# Patient Record
Sex: MALE | Race: WHITE | NOT HISPANIC OR LATINO | Employment: UNEMPLOYED | URBAN - METROPOLITAN AREA
[De-identification: names, ages, dates, MRNs, and addresses within clinical notes are randomized per-mention and may not be internally consistent; named-entity substitution may affect disease eponyms.]

---

## 2021-12-07 ENCOUNTER — APPOINTMENT (OUTPATIENT)
Dept: GENERAL RADIOLOGY | Facility: CLINIC | Age: 21
End: 2021-12-07
Attending: EMERGENCY MEDICINE
Payer: COMMERCIAL

## 2021-12-07 ENCOUNTER — HOSPITAL ENCOUNTER (EMERGENCY)
Facility: CLINIC | Age: 21
Discharge: ANOTHER HOSPITAL | End: 2021-12-08
Attending: EMERGENCY MEDICINE | Admitting: EMERGENCY MEDICINE
Payer: COMMERCIAL

## 2021-12-07 ENCOUNTER — TELEPHONE (OUTPATIENT)
Dept: BEHAVIORAL HEALTH | Facility: CLINIC | Age: 21
End: 2021-12-07

## 2021-12-07 DIAGNOSIS — R45.851 SUICIDAL IDEATION: ICD-10-CM

## 2021-12-07 DIAGNOSIS — R41.89 DISORGANIZED THINKING: ICD-10-CM

## 2021-12-07 LAB
AMPHETAMINES UR QL SCN: ABNORMAL
BARBITURATES UR QL: ABNORMAL
BENZODIAZ UR QL: ABNORMAL
CANNABINOIDS UR QL SCN: ABNORMAL
COCAINE UR QL: ABNORMAL
OPIATES UR QL SCN: ABNORMAL
SARS-COV-2 RNA RESP QL NAA+PROBE: NEGATIVE

## 2021-12-07 PROCEDURE — 87635 SARS-COV-2 COVID-19 AMP PRB: CPT | Performed by: EMERGENCY MEDICINE

## 2021-12-07 PROCEDURE — 250N000013 HC RX MED GY IP 250 OP 250 PS 637: Performed by: EMERGENCY MEDICINE

## 2021-12-07 PROCEDURE — 99285 EMERGENCY DEPT VISIT HI MDM: CPT | Mod: 25 | Performed by: EMERGENCY MEDICINE

## 2021-12-07 PROCEDURE — 90791 PSYCH DIAGNOSTIC EVALUATION: CPT

## 2021-12-07 PROCEDURE — 73110 X-RAY EXAM OF WRIST: CPT | Mod: LT

## 2021-12-07 PROCEDURE — C9803 HOPD COVID-19 SPEC COLLECT: HCPCS | Performed by: EMERGENCY MEDICINE

## 2021-12-07 PROCEDURE — 73130 X-RAY EXAM OF HAND: CPT | Mod: LT

## 2021-12-07 PROCEDURE — 80307 DRUG TEST PRSMV CHEM ANLYZR: CPT | Performed by: EMERGENCY MEDICINE

## 2021-12-07 PROCEDURE — 99284 EMERGENCY DEPT VISIT MOD MDM: CPT | Performed by: EMERGENCY MEDICINE

## 2021-12-07 RX ORDER — TEMAZEPAM 30 MG
30 CAPSULE ORAL AT BEDTIME
Status: DISCONTINUED | OUTPATIENT
Start: 2021-12-07 | End: 2021-12-08 | Stop reason: HOSPADM

## 2021-12-07 RX ORDER — RISPERIDONE 1 MG/1
1 TABLET ORAL AT BEDTIME
Status: DISCONTINUED | OUTPATIENT
Start: 2021-12-07 | End: 2021-12-08 | Stop reason: HOSPADM

## 2021-12-07 RX ORDER — GUANFACINE 2 MG/1
4 TABLET, EXTENDED RELEASE ORAL DAILY
Status: DISCONTINUED | OUTPATIENT
Start: 2021-12-08 | End: 2021-12-08 | Stop reason: HOSPADM

## 2021-12-07 RX ORDER — DULOXETIN HYDROCHLORIDE 30 MG/1
30 CAPSULE, DELAYED RELEASE ORAL DAILY
Status: DISCONTINUED | OUTPATIENT
Start: 2021-12-08 | End: 2021-12-08 | Stop reason: HOSPADM

## 2021-12-07 RX ORDER — PRAZOSIN HYDROCHLORIDE 2 MG/1
4 CAPSULE ORAL AT BEDTIME
Status: DISCONTINUED | OUTPATIENT
Start: 2021-12-07 | End: 2021-12-08 | Stop reason: HOSPADM

## 2021-12-07 RX ORDER — VILAZODONE HYDROCHLORIDE 20 MG/1
20 TABLET ORAL DAILY
Status: ON HOLD | COMMUNITY
End: 2021-12-15

## 2021-12-07 RX ORDER — PRAZOSIN HYDROCHLORIDE 2 MG/1
4 CAPSULE ORAL AT BEDTIME
Status: ON HOLD | COMMUNITY
End: 2021-12-15

## 2021-12-07 RX ORDER — IBUPROFEN 200 MG
200-400 TABLET ORAL EVERY 4 HOURS PRN
COMMUNITY

## 2021-12-07 RX ORDER — GUANFACINE 2 MG/1
4 TABLET, EXTENDED RELEASE ORAL EVERY MORNING
Status: ON HOLD | COMMUNITY
End: 2021-12-15

## 2021-12-07 RX ORDER — TEMAZEPAM 30 MG
30 CAPSULE ORAL AT BEDTIME
Status: ON HOLD | COMMUNITY
End: 2021-12-15

## 2021-12-07 RX ORDER — VILAZODONE HYDROCHLORIDE 10 MG/1
20 TABLET ORAL DAILY
Status: DISCONTINUED | OUTPATIENT
Start: 2021-12-08 | End: 2021-12-08 | Stop reason: HOSPADM

## 2021-12-07 RX ORDER — DULOXETIN HYDROCHLORIDE 30 MG/1
30 CAPSULE, DELAYED RELEASE ORAL EVERY MORNING
Status: ON HOLD | COMMUNITY
End: 2021-12-15

## 2021-12-07 RX ORDER — LISDEXAMFETAMINE DIMESYLATE 40 MG/1
40 CAPSULE ORAL EVERY MORNING
Status: ON HOLD | COMMUNITY
End: 2021-12-15

## 2021-12-07 RX ORDER — LAMOTRIGINE 100 MG/1
250 TABLET ORAL EVERY MORNING
Status: ON HOLD | COMMUNITY
End: 2021-12-15

## 2021-12-07 RX ORDER — LISDEXAMFETAMINE DIMESYLATE 20 MG/1
40 CAPSULE ORAL DAILY
Status: DISCONTINUED | OUTPATIENT
Start: 2021-12-08 | End: 2021-12-08 | Stop reason: HOSPADM

## 2021-12-07 RX ORDER — RISPERIDONE 1 MG/1
1 TABLET ORAL AT BEDTIME
Status: ON HOLD | COMMUNITY
End: 2021-12-15

## 2021-12-07 RX ADMIN — TEMAZEPAM 30 MG: 30 CAPSULE ORAL at 23:00

## 2021-12-07 RX ADMIN — PRAZOSIN HYDROCHLORIDE 4 MG: 2 CAPSULE ORAL at 23:00

## 2021-12-07 RX ADMIN — RISPERIDONE 1 MG: 1 TABLET ORAL at 23:00

## 2021-12-07 NOTE — ED PROVIDER NOTES
"ED Provider Note  [facility]      History     Chief Complaint   Patient presents with     Suicidal     was at his therapist today, was told to go the ED for Psyche eval, per pt he has been having suicidal thoughts, denies any plans. Dx: ADHD, Autism, WARREN, Major depressive d/o     Self Harm - Deliberate (Cpm)     \"Slammed my head on a counter top, pulled the drawer and my L hand must have bumped it on the wall, now it's hurting\" Denied wanting to harm self when SIB was done. L hand red, swollen on the lateral side      The history is provided by the patient and medical records.     Yoni Tran is a 21 year old male with history of autism, ADHD, MDD, and WARREN presenting to the ED as recommended by his therapist for suicidal ideation and self-injurious behavior.  Patient states that today he became extremely frustrated,  had no other way to express his emotions, and did not want to hurt anyone else around him, so he slammed his head on a counter in front of him.  He did not lose consciousness.  He endorses some mild pain in the right forehead where he hit the counter.  He states that he feels a little foggy, but has not taken his ADHD medications today. He denies nausea, vomiting, vision changes, numbness, tingling, weakness, neck pain.  He also reports pulling a drawer out very fast and hitting his left hand.  He endorses pain in the left ulnar hand now.  He denies that this was with the intent to self-harm. Patient states that in the past 6 months he has been in a \"rut\" of doing nothing but going to work, coming home and consuming media and only consuming media on the days that he is not working.  He states that he has lots of pent-up emotions, ruminates on the past, and worries he is making bad decisions and regrets them after. He states that this has happened to him in the past and it all builds up to a peak that results in hospitalization which is where he feels this could be " "headed, but he wants to avoid. He states that the is a \"broken bone that needs fixing\".  He states that he has tried lots of medications for his anxiety and depression in the past 4 years.  He was started on temazepam last week and is tapering down on one of his ADHD meds.  No other recent medication changes.    Past Medical History  No past medical history on file.  No past surgical history on file.  No current outpatient medications on file.    No Known Allergies  Family History  No family history on file.  Social History   Social History     Tobacco Use     Smoking status: Not on file     Smokeless tobacco: Not on file   Substance Use Topics     Alcohol use: Never     Drug use: Not on file      Past medical history, past surgical history, medications, allergies, family history, and social history were reviewed with the patient. No additional pertinent items.       Review of Systems  A complete review of systems was performed with pertinent positives and negatives noted in the HPI, and all other systems negative.    Physical Exam   BP: (!) 137/93  Pulse: 71  Temp: 98.8  F (37.1  C)  Resp: 16  Weight: 87.5 kg (193 lb)  SpO2: 100 %  Physical Exam  General: patient is alert and oriented, anxious appearing    Head: small area of ecchymosis on the frontal scalp, no boggy hematoma or step off, normocephalic   EENT: moist mucus membranes without tonsillar erythema or exudates, pupils 4mm equal round and reactive, EOMI, no postauricular ecchymoses, no hemotympanum, no periorbital ecchymosis  Neck: supple with full range of motion, no midline cervical spine tenderness to palpation  Cardiovascular: regular rate and rhythm, extremities warm and well perfused, no lower extremity edema  Pulmonary: lungs clear to auscultation bilaterally   Abdomen: soft, non-tender   Musculoskeletal: normal range of motion of the extremities, tenderness to palpation along the left dorsal ulnar hand near the wrist with slight swelling, able to " fully flex and extend at the wrist, able to fully flex and extend at both actively and against resistance at the MCP, PIP and DIP joints  Neurological:alert and oriented, moving all extremities symmetrically, CN II-XII intact, strength 5/5 and symmetric in , elbow flexion/extension, hip flexion/extension, knee flexion/extension, sensation to light touch in distal upper and lower extremities intact, normal gait  Skin: warm, dry     ED Course      Procedures         Mental Health Risk Assessment      PSS-3    Date and Time Over the past 2 weeks have you felt down, depressed, or hopeless? Over the past 2 weeks have you had thoughts of killing yourself? Have you ever attempted to kill yourself? When did this last happen? User   12/07/21 1555 yes yes yes more than 6 months ago IT      C-SSRS ([place])    Date and Time Q1 Wished to be Dead (Past Month) Q2 Suicidal Thoughts (Past Month) Q3 Suicidal Thought Method Q4 Suicidal Intent without Specific Plan Q5 Suicide Intent with Specific Plan Q6 Suicide Behavior (Lifetime) Within the Past 3 Months? RETIRED: Level of Risk per Screen Screening Not Complete User   12/07/21 1555 yes no no yes no yes -- -- -- IT              Suicide assessment completed by mental health (D.E.C., LCSW, etc.)       No results found for any visits on 12/07/21.  Medications - No data to display     Assessments & Plan (with Medical Decision Making)   Mr. Tran is a 21 year old male with a history of ADHD, autism, generalized anxiety disorder and major depressive disorder who presents with suicidal thoughts and self-injurious behavior.  He does have a small area of ecchymoses on the frontal scalp but has no other red flag findings for significant intracranial injury.  Does not have indication for further imaging by [facility] head CT rules.  At this time we will plan to observe clinically.  He does report also hitting his left hand against a wall and has tenderness to palpation along the proximal  left hand and wrist.  X-ray was obtained which are negative for fracture.  Patient was seen and evaluated by DEC Assessors with recommendation for inpatient hospitalization for safety and stabilization as he is quite disorganized and has been increasingly anxious and depressed with suicidal thoughts.    I have reviewed the nursing notes. I have reviewed the findings, diagnosis, plan and need for follow up with the patient.    New Prescriptions    No medications on file       Final diagnoses:   Suicidal ideation   Disorganized thinking   I, Jyoti Cevallos, am serving as a trained medical scribe to document services personally performed by Belén Meade MD, based on the provider's statements to me.     I, Belén Meade MD, was physically present and have reviewed and verified the accuracy of this note documented by Jyoti Cevallos.      --  Belén Meade MD  [facility] EMERGENCY DEPARTMENT  12/7/2021     Belén Meade MD  12/07/21 [id number]

## 2021-12-07 NOTE — TELEPHONE ENCOUNTER
"S: 5:04 pm Pt is a 21 yr old male in [place] ED for SI and possible psychosis report by [facility]    B: Hx of ADHD, ASD, and depression.  Pt reports primary stressors: family issues.   reports pt is struggling to answer questions, is not oriented to situation, is not doing his ADLs, is disorganized and tangential.  Pt reports if he was suicidal he would \"bury myself.\"  1 prev ip admission.  No reported medical concerns.  Pt reports using Delta 8.  COVID test processing.  Drug screen ordered.     A: vol     R: Pt waiting in the ED for appropriate placement     Patient cleared and ready for behavioral bed placement: Yes    "

## 2021-12-07 NOTE — ED NOTES
"12/7/2021  Yoni JENNIFER [place] 2000     [facility] Crisis Assessment    Patient was assessed: in person  Patient location: [facility]    Referral Data and Chief Complaint  Patient is a 21 year old who uses he/him pronouns. Patient presented to the ED with mother Zuleika per therapist's recommendation. Patient is presenting to the ED for the following concerns: suicidal ideation.      Informed Consent and Assessment Methods    Patient is his own guardian. Writer met with patient and explained the crisis assessment process, including applicable information disclosures and limits to confidentiality, assessed understanding of the process, and obtained consent to proceed with the assessment. Patient was observed to be able to participate in the assessment as evidenced by verbal consent. Assessment methods included conducting a formal interview with patient, review of medical records, collaboration with medical staff, and obtaining relevant collateral information from family and community providers when available.    Narrative Summary of Presenting Problem and Current Functioning  What led to the patient presenting for crisis services, factors that make the crisis life threatening or complex, stressors, how is this disrupting the patient's life, and how current functioning is in comparison to baseline. How is patient presenting during the assessment.     Patient reports the big overarching problem is his depression over the past 4 years, with only 1 break of a summer.  Patient cites primary stressor as being diagnosed with ADHD and autism recently which put a lot of things in perspective, putting on his dad has alcohol abuse, parents  after 28 years, school entering their money and leaving them $40,000 left in the bank. Patient denies any new or acute stressors today. Patient reports this \"could have been any day in the past year, no particular reason today\".    Patient reports feeling as though he has \"no " "idea who he is, has a lack of life skills, feels he is an embarrassment, and no typical merits\".  Patient reports he has felt unable to function, has no consistent routine, no motivation to change, and when he tries to change she cannot keep it up.  Patient reports having difficulty verbalizing his emotions and reports he is saying the opposite of what he wants to be saying.  Patient believes that there is no point to our conversation.  Patient then asks to restart our assessment.    Patient endorses suicidal ideation, \"if I can't say no, yes\".  Patient reports he would kill himself \"in a way where it would not affect innocent bystanders\".  Patient reports he would want to snap his fingers and just die.  Patient gives one example as an explosion that would not hurt other people, another plan to bury himself.      History of the Crisis  Duration of the current crisis, coping skills attempted to reduce the crisis, community resources used, and past presentations.    Patient has history of inpatient mental health admission, most recently in 11/2020 in [place], [place] following an aborted suicide attempt at the [facility].  Patient's mother describes patient attending different stints at treatment facilities historically, patient did not follow up with or complete.  Patient has established individual therapy and medication management at this time.    Collateral Information   spoke with patient's mother Zuleika who was present in our emergency department: Patient's mother reports patient approached her this morning requesting to come to our emergency department.  Patient's mother attempted to de-escalate the situation and encouraged him to attend his already scheduled therapy appointment. Patient did not finish his appointment before therapist recommended further evaluation in our emergency department.  Patient then became agitated, slamming his head on his desk and pulling a drawer out aggressively.  " "Patient is described as a gentle person at baseline.  Patient's mother transported him to our emergency department per therapist recommendation and patient's request.    Risk Assessment    Risk of Harm to Self     ESS-6  1.a. Over the past 2 weeks, have you had thoughts of killing yourself? Yes  1.b. Have you ever attempted to kill yourself and, if yes, when did this last happen? Yes aborted suicidal ideation last year, [place] the [place] and drove there.   2. Recent or current suicide plan? Yes bury self   3. Recent or current intent to act on ideation? \"If I can't say no, then yes\"  4. Lifetime psychiatric hospitalization? Yes  5. Pattern of excessive substance use? Yes  6. Current irritability, agitation, or aggression? Yes  Scoring note: BOTH 1a and 1b must be yes for it to score 1 point, if both are not yes it is zero. All others are 1 point per number. If all questions 1a/1b - 6 are no, risk is negligible. If one of 1a/1b is yes, then risk is mild. If either question 2 or 3, but not both, is yes, then risk is automatically moderate regardless of total score. If both 2 and 3 are yes, risk is automatically high regardless of total score.     Score: 6, high risk    The patient has the following risk factors for suicide: depressive symptoms, poor decision making, poor impulse control, significant behavioral changes and restless/agitated    Is the patient experiencing current suicidal ideation: Yes. Plan: bury himself \"in a way where it wouldn't affect innocent bystanders\" Intent \"if I can't say no, then yes\"    Is the patient engaging in preparatory suicide behaviors (formulating how to act on plan, giving away possessions, saying goodbye, displaying dramatic behavior changes, etc)? Yes formulating plan, negative self talk, hopelessness.    Does the patient have access to firearms or other lethal means? no    The patient has the following protective factors: established " relationship community mental health provider(s), safe/stable housing and fulfilling employment    Support system information: patient has support from mother, therapist, and medication provider.    Does the patient engage in non-suicidal self-injurious behavior (NSSI/SIB)?  Patient endorses scratching himself 1 month ago, reports he felt better at the time afterwards.    Is the patient vulnerable to sexual exploitation?  No    Is the patient experiencing abuse or neglect? no    Is the patient a vulnerable adult? No      Risk of Harm to Others  The patient has the following risk factors of harm to others: no risk factors identified    Does the patient have thoughts of harming others? No    Is the patient engaging in sexually inappropriate behavior?  no       Current Substance Abuse    Is there recent substance abuse?  Patient endorses vaping delta-8-Tetrahydrocannabinol on a daily basis for the past 1 year, most recently last night.    Was a urine drug screen or blood alcohol level obtained: Yes ordered not yet collected.      Current Symptoms/Concerns    Symptoms  Attention, hyperactivity, and impulsivity symptoms present: Yes: Disorganized/Forgetful, Inattentive and Restless    Anxiety symptoms present: Yes: Generalized Symptoms: Agitation, Avoidance, Cognitive anxiety - feelings of doom, racing thoughts, difficulty concentrating  and Excessive worry      Appetite symptoms present: No     Behavioral difficulties present: Yes: Agitation, Disruptive and Impulsivity/Disinhibition     Cognitive impairment symptoms present: Yes: Decision-Making and Judgment/Insight    Depressive symptoms present: Yes Depressed mood, Excessive guilt , Feelings of helplessness , Feelings of hopelessness , Feelings of worthlessness , Loss of interest / Anhedonia , Low self esteem  and Thoughts of suicide/death      Eating disorder symptoms present: No    Learning disabilities, cognitive challenges, and/or developmental disorder symptoms  "present: Yes: Mood and Self-Regulation     Manic/hypomanic symptoms present: Yes Flight of ideas and Distractibility     Personality and interpersonal functioning difficulties present : Yes: Cognitive Distortions and Emotional Deregulation    Psychosis symptoms present: No      Sleep difficulties present: Patient states \"sometimes I sleep, sometimes I don't\".    Substance abuse disorder symptoms present: Yes Continued substance use despite having persistent or recurrent social or interpersonal problems caused by or exacerbated by the use of substance(s), Important social, occupational, or recreational activities are given up or reduced because of substance use and Substance abuse is continued despite knowledge of having a persistent or recurrent physical or psychological problem that has been caused of exacerbated by substance use     Trauma and stressor related symptoms present: No           Mental Status Exam   Affect: Labile   Appearance: Disheveled    Attention Span/Concentration: Inattentive?    Eye Contact: Avoidant   Fund of Knowledge: Appropriate    Language /Speech Content: Fluent   Language /Speech Volume: Normal    Language /Speech Rate/Productions: Minimally Responsive    Recent Memory: Variable   Remote Memory: Variable   Mood: Anxious    Orientation to Person: Yes    Orientation to Place: Yes   Orientation to Time of Day: Yes    Orientation to Date: Yes    Situation (Do they understand why they are here?): No    Psychomotor Behavior: Agitated    Thought Content: Suicidal   Thought Form: Flight of Ideas, Loose Associations and Tangential       Mental Health and Substance Abuse History    History  Current and historical diagnoses or mental health concerns: Patient has a history of autism, ADHD, and depression.    Prior MH services (inpatient, programmatic care, outpatient, etc) : Yes Patient has a history of inpatient mental health admission, most recently last year in [facility] following an " "aborted suicide attempt at the [facility].  Patient's mother describes patient attending different stents at treatment facilities historically, patient did not follow up with or complete.    History of substance abuse: Yes history of polysubstance abuse, most recently only using delta 8.    Prior DYLLAN services (inpatient, programmatic care, detox, outpatient, etc) : No    History of commitment: No    Family history of MH/DYLLAN: Yes patient reports his father has alcohol abuse.    Trauma history: No    Medication  Psychotropic medications: Unknown. Patient denies taking his medication as prescribed today, not showering, or drinking his coffee.    Current Care Team  Primary Care Provider: [facility] [place]    Psychiatrist: Unknown.    Therapist: Nathalie Callahan Psy.D    : No    CTSS or ARMHS: No    ACT Team: No    Other: No    Release of Information  Was a release of information signed: No. Reason: patient too disorganized.      Biopsychosocial Information    Socioeconomic Information  Current living situation: Patient lives with his mother.  Patient's parents are  for 28 years and recently .  Patient does not speak to his father anymore.     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  Patient's highest level of education is some college at [employer], [employer], and [employer].    Relevant legal issues: None reported.    Cultural, Evangelical, or spiritual influences on mental health care: None reported.    Is the patient active in the  or a : None reported.      Relevant Medical Concerns   Patient identifies concerns with completing ADLs? Yes     Patient can ambulate independently? Yes     Other medical concerns? Yes patient has pain in his arm from pulling a drawer out aggressively this afternoon.  Patient also \"slammed his head against a desk\".     History of concussion or TBI? 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 Patient response to intervention: disengaged.      Disposition  Recommended disposition: Inpatient Mental Health      Reviewed case and recommendations with attending provider. Attending Name: Dr. Belén Angel      Attending concurs with disposition: Yes      Patient concurs with disposition: Yes      Guardian concurs with disposition: NA     Final disposition: Inpatient mental health .     Inpatient Details (if applicable):  Is patient admitted voluntarily:Yes    Patient aware of potential for transfer if there is not appropriate placement? No     Patient is willing to travel outside of the Health system for placement? No      Behavioral Intake Notified? Yes: Date: 12/7/2021 Time: 5:30pm.       Clinical Substantiation of Recommendations   Rationale with supporting factors for disposition and diagnosis.     Patient presents as disorganized, inattentive, agitated, having difficulty finding words.  Patient has difficulty answering even close ended, yes/no questions as evidenced by pulling his hair and losing his train of thought.  Patient cites various stressors that are difficult to track, denies any acute changes in them. Patient endorses self-injurious behavior including hitting himself 1 month ago which he reports made him feel better at the time. Patient  endorses suicidal ideation, intent, and  plan. Patient denies homicidal ideation, intent, and plan. Patient denies symptoms of psychosis. Patient does not appear to be responding to internal stimuli. Patient  endorses substance use including delta-8-Tetrahydrocannabinol, please see details above.         Assessment Details  Patient interview started at: 4:15pm and completed at: 5:00pm.    Total duration spent on the patient case in minutes: .75 hrs     CPT code(s) utilized: 26793 - Psychotherapy for Crisis - 60 (30-74*) min     TEMO Woods

## 2021-12-08 ENCOUNTER — HOSPITAL ENCOUNTER (INPATIENT)
Facility: CLINIC | Age: 21
LOS: 7 days | Discharge: HOME | End: 2021-12-15
Attending: PSYCHIATRY & NEUROLOGY | Admitting: PSYCHIATRY & NEUROLOGY
Payer: COMMERCIAL

## 2021-12-08 VITALS
TEMPERATURE: 98.4 F | WEIGHT: 193 LBS | BODY MASS INDEX: 25.46 KG/M2 | RESPIRATION RATE: 18 BRPM | HEART RATE: 76 BPM | SYSTOLIC BLOOD PRESSURE: 124 MMHG | DIASTOLIC BLOOD PRESSURE: 72 MMHG | OXYGEN SATURATION: 99 %

## 2021-12-08 DIAGNOSIS — F33.1 MAJOR DEPRESSIVE DISORDER, RECURRENT EPISODE, MODERATE (H): ICD-10-CM

## 2021-12-08 DIAGNOSIS — F43.10 PTSD (POST-TRAUMATIC STRESS DISORDER): ICD-10-CM

## 2021-12-08 DIAGNOSIS — M79.2 NERVE PAIN: ICD-10-CM

## 2021-12-08 DIAGNOSIS — G47.09 OTHER INSOMNIA: ICD-10-CM

## 2021-12-08 DIAGNOSIS — F90.8 ATTENTION DEFICIT HYPERACTIVITY DISORDER (ADHD), OTHER TYPE: Primary | Chronic | ICD-10-CM

## 2021-12-08 PROBLEM — R45.851 DEPRESSION WITH SUICIDAL IDEATION: Status: ACTIVE | Noted: 2021-12-08

## 2021-12-08 PROBLEM — F32.A DEPRESSION WITH SUICIDAL IDEATION: Status: ACTIVE | Noted: 2021-12-08

## 2021-12-08 PROCEDURE — 128N000001 HC R&B CD/MH ADULT

## 2021-12-08 PROCEDURE — 250N000013 HC RX MED GY IP 250 OP 250 PS 637: Performed by: FAMILY MEDICINE

## 2021-12-08 PROCEDURE — 250N000013 HC RX MED GY IP 250 OP 250 PS 637: Performed by: NURSE PRACTITIONER

## 2021-12-08 PROCEDURE — 250N000013 HC RX MED GY IP 250 OP 250 PS 637: Performed by: EMERGENCY MEDICINE

## 2021-12-08 RX ORDER — OLANZAPINE 10 MG/2ML
10 INJECTION, POWDER, FOR SOLUTION INTRAMUSCULAR 3 TIMES DAILY PRN
Status: DISCONTINUED | OUTPATIENT
Start: 2021-12-08 | End: 2021-12-15 | Stop reason: HOSPADM

## 2021-12-08 RX ORDER — TEMAZEPAM 15 MG/1
30 CAPSULE ORAL AT BEDTIME
Status: DISCONTINUED | OUTPATIENT
Start: 2021-12-08 | End: 2021-12-15 | Stop reason: HOSPADM

## 2021-12-08 RX ORDER — ACETAMINOPHEN 325 MG/1
650 TABLET ORAL EVERY 4 HOURS PRN
Status: DISCONTINUED | OUTPATIENT
Start: 2021-12-08 | End: 2021-12-09

## 2021-12-08 RX ORDER — MAGNESIUM HYDROXIDE/ALUMINUM HYDROXICE/SIMETHICONE 120; 1200; 1200 MG/30ML; MG/30ML; MG/30ML
30 SUSPENSION ORAL EVERY 4 HOURS PRN
Status: DISCONTINUED | OUTPATIENT
Start: 2021-12-08 | End: 2021-12-15 | Stop reason: HOSPADM

## 2021-12-08 RX ORDER — RISPERIDONE 0.5 MG/1
1 TABLET ORAL AT BEDTIME
Status: DISCONTINUED | OUTPATIENT
Start: 2021-12-08 | End: 2021-12-09

## 2021-12-08 RX ORDER — PRAZOSIN HYDROCHLORIDE 1 MG/1
4 CAPSULE ORAL AT BEDTIME
Status: DISCONTINUED | OUTPATIENT
Start: 2021-12-08 | End: 2021-12-15 | Stop reason: HOSPADM

## 2021-12-08 RX ORDER — TRAZODONE HYDROCHLORIDE 50 MG/1
50 TABLET, FILM COATED ORAL
Status: DISCONTINUED | OUTPATIENT
Start: 2021-12-08 | End: 2021-12-15 | Stop reason: HOSPADM

## 2021-12-08 RX ORDER — IBUPROFEN 600 MG/1
600 TABLET, FILM COATED ORAL ONCE
Status: COMPLETED | OUTPATIENT
Start: 2021-12-08 | End: 2021-12-08

## 2021-12-08 RX ORDER — HYDROXYZINE HYDROCHLORIDE 25 MG/1
25 TABLET, FILM COATED ORAL EVERY 4 HOURS PRN
Status: DISCONTINUED | OUTPATIENT
Start: 2021-12-08 | End: 2021-12-15 | Stop reason: HOSPADM

## 2021-12-08 RX ORDER — OLANZAPINE 10 MG/1
10 TABLET ORAL 3 TIMES DAILY PRN
Status: DISCONTINUED | OUTPATIENT
Start: 2021-12-08 | End: 2021-12-15 | Stop reason: HOSPADM

## 2021-12-08 RX ADMIN — GUANFACINE 4 MG: 2 TABLET, EXTENDED RELEASE ORAL at 10:06

## 2021-12-08 RX ADMIN — DULOXETINE HYDROCHLORIDE 30 MG: 30 CAPSULE, DELAYED RELEASE ORAL at 09:30

## 2021-12-08 RX ADMIN — IBUPROFEN 600 MG: 600 TABLET ORAL at 16:19

## 2021-12-08 RX ADMIN — LAMOTRIGINE 250 MG: 25 TABLET ORAL at 09:30

## 2021-12-08 RX ADMIN — VILAZODONE HYDROCHLORIDE 20 MG: 10 TABLET ORAL at 10:06

## 2021-12-08 RX ADMIN — PRAZOSIN HYDROCHLORIDE 4 MG: 1 CAPSULE ORAL at 20:49

## 2021-12-08 RX ADMIN — RISPERIDONE 1 MG: 0.5 TABLET ORAL at 22:04

## 2021-12-08 RX ADMIN — IBUPROFEN 600 MG: 600 TABLET ORAL at 09:45

## 2021-12-08 RX ADMIN — TEMAZEPAM 30 MG: 15 CAPSULE ORAL at 22:04

## 2021-12-08 RX ADMIN — LISDEXAMFETAMINE DIMESYLATE 40 MG: 20 CAPSULE ORAL at 09:30

## 2021-12-08 RX ADMIN — HYDROXYZINE HYDROCHLORIDE 25 MG: 25 TABLET, FILM COATED ORAL at 22:07

## 2021-12-08 ASSESSMENT — ACTIVITIES OF DAILY LIVING (ADL)
DIFFICULTY_EATING/SWALLOWING: NO
DOING_ERRANDS_INDEPENDENTLY_DIFFICULTY: YES
DIFFICULTY_COMMUNICATING: NO
ADL_ASSESSMENT: WDL
WEAR_GLASSES_OR_BLIND: NO
HEARING_DIFFICULTY_OR_DEAF: NO
PATIENT_/_FAMILY_COMMUNICATION_STYLE: SPOKEN LANGUAGE (ENGLISH OR BILINGUAL)
TOILETING_ISSUES: NO
WALKING_OR_CLIMBING_STAIRS_DIFFICULTY: NO
CONCENTRATING,_REMEMBERING_OR_MAKING_DECISIONS_DIFFICULTY: NO
FALL_HISTORY_WITHIN_LAST_SIX_MONTHS: NO
DRESSING/BATHING_DIFFICULTY: NO

## 2021-12-08 ASSESSMENT — MIFFLIN-ST. JEOR: SCORE: 1883.96

## 2021-12-08 NOTE — SAFE
"Yoni Tran  December 7, 2021    SAFE Note    Critical Safety Issues: Patient presents as disorganized, inattentive, agitated, having difficulty finding words.  Patient has difficulty answering even close ended, yes/no questions as evidenced by pulling his hair and losing his train of thought.  Patient cites various stressors that are difficult to track, denies any acute changes in them.    Patient endorses suicidal ideation, \"if I can't say no, yes\".  Patient reports he would kill himself \"in a way where it would not affect innocent bystanders\".  Patient reports he would want to snap his fingers and just die.  Patient gives one example as an explosion that would not hurt other people, another plan to bury himself.      Current Suicidal Ideation/Self-Injurious Concerns/Methods: Other explosion or burying self      Current or Historical Inappropriate Sexual Behavior: No      Current or Historical Aggression/Homicidal Ideation: None - N/A      Triggers: Patient reports this \"could have been any day in the past year, no particular reason today\".    Updated care team: Yes: MD and RN aware.    For additional details see full [facility] assessment.       TEMO Woods    "

## 2021-12-08 NOTE — ED NOTES
Sign out Provider: Leonard      Sign out Plan: Patient has a history of ADHD autism spectrum disorder and generalized anxiety disorder with depression who presented due to suicidal thoughts and self-injurious behavior. Was cleared medically, evaluated by mental health , and placed in the queue for psychiatric admission.      Reassessment: No events on shift prior, currently resting.      Disposition: Inpatient psychiatry admission. Awaiting bed placement       Russell Sue MD  12/08/21 0817

## 2021-12-08 NOTE — TELEPHONE ENCOUNTER
R:  Patient cleared and ready for behavioral bed placement: Yes     UTOX positive for Amphetamines, Benzo's and cannabis.    Covid is NEGATIVE    Paged provider @ 12:25pm to present for 4500/Haley.  Provider - Rudi called back @ 12:30pm and accepted for 4500/Haley.  Pt placed in que and unit called with disposition @ 12:32pm  [place] ED Updated with placement - to call for report @ 4:30pm to [facility] 4500.

## 2021-12-08 NOTE — ED NOTES
"Extended Care    Yoni Tran  December 8, 2021    Yoni is followed related to Long wait time for admission: 23+ hours in the ED. Please see initial DEC Crisis Assessment completed by TEMO Woods on 12/7/2021 for complete assessment information. Notable concerns include suicidal ideation.    Patient is interviewed via ipad. Pt is alert and interactive; affect is appropriate; mood is anxious. Pt has passive suicidal thoughts described as \"whats the point. Will I even be here in a month\". There are not concerns for Aggression.  Over the course of contact, provided reassurance, offered validation, engaged patient in problem solving and disposition planning and provided psychoeducation.     Pt reported that he was \"good\" because he was just informed about a potential transfer to [facility] for inpatient care. He expressed that he is ready to work on a plan and \"happy to be in a safe space\". He shared several major life changes of occurred since the start of summer and things have progressively gotten \"worse and worse\". He identified that he is struggling to manage all the recent life changes and find transition into adulthood. He expressed several times that he is ready for support. Pt was engaged through out meeting and appeared anxious as he repeatedly combed/pulled at his hair and rubbed his forehead during meeting.     Coordination with ED Nurse Priscilla. She reported that pt will be transferring to [facility] this evening.     ED care team is updated    Plan:     Continue to monitor for harm. Consider: Allow family calls/visits and Listen in a neutral, non-judgemental way. Offer reassurance    Continue care coordination with ED and central intake.      Maintain current transition plan. Next steps include: inpatient mental health.    DEC will follow until discharge. DEC may be reached at [phone] if further clinical intervention is needed.     Tali Guaman, LICSW  LMHP, P Extended Care "   [phone]

## 2021-12-08 NOTE — PHARMACY-ADMISSION MEDICATION HISTORY
Admission Medication History Completed by Pharmacy    See [facility] Admission Navigator for allergy information, preferred outpatient pharmacy, prior to admission medications and immunization status.     Medication History Sources:     Patient    Changes made to PTA medication list (reason):    Added: all    Deleted: None    Changed: None    Additional Information:    Patient was very familiar with his medications    Duloxetine- currently taper up as described below     Vilazodone - currently tapering off as described below     Per patient's mother, patient has been taking 2 capsules of temazepam for 60 mg HS for the past couple of nights (but it is not prescribed this way)    Patient fills at the [facility] on [address] in [place], [place] (was not able to call as currently closed)        Prior to Admission medications    Medication Sig Last Dose Taking? Auth Provider   DULoxetine (CYMBALTA) 30 MG capsule Take 30 mg by mouth every morning Taper up; **starting 12/8/21** take 2 capsules (60 mg) daily x7 days then 3 capsules (90 mg) daily 12/6/2021 at Unknown time Yes Unknown, Entered By History   guanFACINE (INTUNIV) 2 MG TB24 24 hr tablet Take 4 mg by mouth every morning 12/6/2021 at Unknown time Yes Unknown, Entered By History   lamoTRIgine (LAMICTAL) 100 MG tablet Take 250 mg by mouth every morning 12/6/2021 at Unknown time Yes Unknown, Entered By History   lisdexamfetamine (VYVANSE) 40 MG capsule Take 40 mg by mouth every morning 12/6/2021 at Unknown time Yes Unknown, Entered By History   prazosin (MINIPRESS) 2 MG capsule Take 4 mg by mouth At Bedtime 12/6/2021 at Unknown time Yes Unknown, Entered By History   risperiDONE (RISPERDAL) 1 MG tablet Take 1 mg by mouth At Bedtime 12/6/2021 at Unknown time Yes Unknown, Entered By History   temazepam (RESTORIL) 30 MG capsule Take 30 mg by mouth At Bedtime 12/6/2021 at Unknown time Yes Unknown, Entered By History   vilazodone (VIIBRYD) 20 MG TABS tablet Take 20 mg by mouth daily  Tapering off; 20 mg daily for 7 more days then off (~12/14/21) 12/6/2021 at AM Yes Unknown, Entered By History       Date completed: 12/07/21    Medication history completed by: Lazaro Chan RP

## 2021-12-08 NOTE — ED NOTES
[facility] ED Mental Health Handoff Note:       Brief HPI:  This is a 21 year old male signed out to me by Dr. Ramos.  See initial ED Provider note for full details of the presentation. Interval history is pertinent for increasing suicidal ideation and disorganized behavior.    Home meds reviewed and ordered/administered: Yes    Medically stable for inpatient mental health admission: Yes.    Evaluated by mental health: Yes. The recommendation is for inpatient mental health treatment. Bed search in process    Safety concerns: At the time I received sign out, there were no safety concerns.    Hold Status:  Active Orders   N/A            Exam:   Patient Vitals for the past 24 hrs:   BP Temp Temp src Pulse Resp SpO2 Weight   12/07/21 2259 (!) 150/83 -- -- 69 -- 100 % --   12/07/21 1553 (!) 137/93 98.8  F (37.1  C) Oral 71 16 100 % 87.5 kg (193 lb)           ED Course:    Medications   DULoxetine (CYMBALTA) DR capsule 30 mg (has no administration in time range)   guanFACINE (INTUNIV) 24 hr tablet 4 mg (has no administration in time range)   lamoTRIgine (LaMICtal) tablet 250 mg (has no administration in time range)   lisdexamfetamine (VYVANSE) capsule 40 mg (has no administration in time range)   prazosin (MINIPRESS) capsule 4 mg (4 mg Oral Given 12/7/21 2300)   risperiDONE (risperDAL) tablet 1 mg (1 mg Oral Given 12/7/21 2300)   temazepam (RESTORIL) capsule 30 mg (30 mg Oral Given 12/7/21 2300)   vilazodone (VIIBRYD) tablet 20 mg (has no administration in time range)            There were no significant events during my shift.    Patient was signed out to the oncoming provider      Impression:    ICD-10-CM    1. Suicidal ideation  R45.851    2. Disorganized thinking  R41.89        Plan:    1. Awaiting inpatient mental health admission/transfer.      RESULTS:   Results for orders placed or performed during the hospital encounter of 12/07/21 (from the past 24 hour(s))   XR Hand Left G/E 3 Views     Status: None     Collection Time: 12/07/21  5:09 PM    Narrative    EXAM: XR HAND LT G/E 3 VW  LOCATION: [facility]  DATE/TIME: 12/7/2021 5:09 PM    INDICATION: Left lateral hand pain.  COMPARISON: None.      Impression    IMPRESSION: Normal joint spaces and alignment. No fracture.   XR Wrist Left G/E 3 Views     Status: None    Collection Time: 12/07/21  5:10 PM    Narrative    EXAM: XR WRIST LEFT G/E 3 VIEWS  LOCATION: [facility]  DATE/TIME: 12/7/2021 5:08 PM    INDICATION: Left-sided wrist pain.  COMPARISON: None.      Impression    IMPRESSION: Normal joint spaces and alignment. No fracture.   Asymptomatic COVID-19 Virus (Coronavirus) by PCR Oropharynx     Status: Normal    Collection Time: 12/07/21  6:13 PM    Specimen: Oropharynx; Swab   Result Value Ref Range    SARS CoV2 PCR Negative Negative    Narrative    Testing was performed using the ya  SARS-CoV-2 & Influenza A/B Assay on the ya  Susi  System.  This test should be ordered for the detection of SARS-COV-2 in individuals who meet SARS-CoV-2 clinical and/or epidemiological criteria. Test performance is unknown in asymptomatic patients.  This test is for in vitro diagnostic use under the FDA EUA for laboratories certified under CLIA to perform moderate and/or high complexity testing. This test has not been FDA cleared or approved.  A negative test does not rule out the presence of PCR inhibitors in the specimen or target RNA in concentration below the limit of detection for the assay. The possibility of a false negative should be considered if the patient's recent exposure or clinical presentation suggests COVID-19.  [facility] are certified under the Clinical Laboratory Improvement Amendments of 1988 (CLIA-88) as qualified to perform moderate and/or high complexity laboratory testing.   Urine Drugs of Abuse Screen     Status: Abnormal    Collection Time:  12/07/21  7:35 PM    Narrative    The following orders were created for panel order Urine Drugs of Abuse Screen.  Procedure                               Abnormality         Status                     ---------                               -----------         ------                     Drug abuse screen 1 urin...[[id number]]  Abnormal            Final result                 Please view results for these tests on the individual orders.   Drug abuse screen 1 urine (ED)     Status: Abnormal    Collection Time: 12/07/21  7:35 PM   Result Value Ref Range    Amphetamines Urine Screen Positive (A) Screen Negative    Barbiturates Urine Screen Negative Screen Negative    Benzodiazepines Urine Screen Positive (A) Screen Negative    Cannabinoids Urine Screen Positive (A) Screen Negative    Cocaine Urine Screen Negative Screen Negative    Opiates Urine Screen Negative Screen Negative             MD Leonard Huffman Matthew Joseph, MD  12/08/21 0738

## 2021-12-09 PROBLEM — F90.9 ADHD (ATTENTION DEFICIT HYPERACTIVITY DISORDER): Chronic | Status: ACTIVE | Noted: 2021-12-09

## 2021-12-09 PROBLEM — F84.0 ACTIVE AUTISTIC DISORDER: Chronic | Status: ACTIVE | Noted: 2021-12-09

## 2021-12-09 PROBLEM — F33.1 MAJOR DEPRESSIVE DISORDER, RECURRENT EPISODE, MODERATE (H): Status: ACTIVE | Noted: 2021-12-09

## 2021-12-09 PROCEDURE — 128N000001 HC R&B CD/MH ADULT

## 2021-12-09 PROCEDURE — 250N000009 HC RX 250: Performed by: NURSE PRACTITIONER

## 2021-12-09 PROCEDURE — 250N000013 HC RX MED GY IP 250 OP 250 PS 637: Performed by: NURSE PRACTITIONER

## 2021-12-09 PROCEDURE — 99223 1ST HOSP IP/OBS HIGH 75: CPT | Performed by: NURSE PRACTITIONER

## 2021-12-09 RX ORDER — LIDOCAINE 50 MG/G
OINTMENT TOPICAL 2 TIMES DAILY
Status: DISCONTINUED | OUTPATIENT
Start: 2021-12-09 | End: 2021-12-11

## 2021-12-09 RX ORDER — LISDEXAMFETAMINE DIMESYLATE 10 MG/1
40 CAPSULE ORAL EVERY MORNING
Status: DISCONTINUED | OUTPATIENT
Start: 2021-12-09 | End: 2021-12-15 | Stop reason: HOSPADM

## 2021-12-09 RX ORDER — VILAZODONE HYDROCHLORIDE 20 MG/1
20 TABLET ORAL DAILY
Status: COMPLETED | OUTPATIENT
Start: 2021-12-09 | End: 2021-12-14

## 2021-12-09 RX ORDER — RISPERIDONE 0.5 MG/1
1.5 TABLET ORAL AT BEDTIME
Status: DISCONTINUED | OUTPATIENT
Start: 2021-12-09 | End: 2021-12-15 | Stop reason: HOSPADM

## 2021-12-09 RX ORDER — DULOXETIN HYDROCHLORIDE 60 MG/1
60 CAPSULE, DELAYED RELEASE ORAL DAILY
Status: COMPLETED | OUTPATIENT
Start: 2021-12-09 | End: 2021-12-14

## 2021-12-09 RX ORDER — GUANFACINE 2 MG/1
4 TABLET, EXTENDED RELEASE ORAL EVERY MORNING
Status: DISCONTINUED | OUTPATIENT
Start: 2021-12-10 | End: 2021-12-15 | Stop reason: HOSPADM

## 2021-12-09 RX ORDER — HYDRALAZINE HYDROCHLORIDE 25 MG/1
25 TABLET, FILM COATED ORAL 2 TIMES DAILY PRN
Status: DISCONTINUED | OUTPATIENT
Start: 2021-12-09 | End: 2021-12-15 | Stop reason: HOSPADM

## 2021-12-09 RX ORDER — ACETAMINOPHEN 325 MG/1
650 TABLET ORAL EVERY 4 HOURS PRN
Status: DISCONTINUED | OUTPATIENT
Start: 2021-12-09 | End: 2021-12-09

## 2021-12-09 RX ORDER — IBUPROFEN 200 MG
400 TABLET ORAL EVERY 6 HOURS PRN
Status: DISCONTINUED | OUTPATIENT
Start: 2021-12-09 | End: 2021-12-15 | Stop reason: HOSPADM

## 2021-12-09 RX ORDER — ACETAMINOPHEN 325 MG/1
650 TABLET ORAL EVERY 8 HOURS
Status: DISCONTINUED | OUTPATIENT
Start: 2021-12-09 | End: 2021-12-09 | Stop reason: ALTCHOICE

## 2021-12-09 RX ORDER — LIDOCAINE 50 MG/G
OINTMENT TOPICAL 2 TIMES DAILY
Status: DISCONTINUED | OUTPATIENT
Start: 2021-12-09 | End: 2021-12-09

## 2021-12-09 RX ORDER — IBUPROFEN 200 MG
400 TABLET ORAL EVERY 6 HOURS PRN
Status: DISCONTINUED | OUTPATIENT
Start: 2021-12-09 | End: 2021-12-09

## 2021-12-09 RX ORDER — VENLAFAXINE HYDROCHLORIDE 37.5 MG/1
37.5 CAPSULE, EXTENDED RELEASE ORAL
Status: DISCONTINUED | OUTPATIENT
Start: 2021-12-10 | End: 2021-12-09

## 2021-12-09 RX ADMIN — VILAZODONE HYDROCHLORIDE 20 MG: 20 TABLET ORAL at 14:22

## 2021-12-09 RX ADMIN — DULOXETINE 60 MG: 60 CAPSULE, DELAYED RELEASE ORAL at 13:12

## 2021-12-09 RX ADMIN — OLANZAPINE 10 MG: 10 TABLET, FILM COATED ORAL at 11:12

## 2021-12-09 RX ADMIN — HYDRALAZINE HYDROCHLORIDE 25 MG: 25 TABLET, FILM COATED ORAL at 17:18

## 2021-12-09 RX ADMIN — LIDOCAINE: 50 OINTMENT TOPICAL at 22:06

## 2021-12-09 RX ADMIN — HYDROXYZINE HYDROCHLORIDE 25 MG: 25 TABLET, FILM COATED ORAL at 17:18

## 2021-12-09 RX ADMIN — IBUPROFEN 400 MG: 200 TABLET, FILM COATED ORAL at 13:17

## 2021-12-09 RX ADMIN — HYDROXYZINE HYDROCHLORIDE 25 MG: 25 TABLET, FILM COATED ORAL at 08:53

## 2021-12-09 RX ADMIN — PRAZOSIN HYDROCHLORIDE 4 MG: 1 CAPSULE ORAL at 22:03

## 2021-12-09 RX ADMIN — TEMAZEPAM 30 MG: 15 CAPSULE ORAL at 22:03

## 2021-12-09 RX ADMIN — LISDEXAMFETAMINE DIMESYLATE 40 MG: 10 CAPSULE ORAL at 13:12

## 2021-12-09 RX ADMIN — RISPERIDONE 1.5 MG: 0.5 TABLET ORAL at 22:03

## 2021-12-09 ASSESSMENT — ACTIVITIES OF DAILY LIVING (ADL)
ORAL_HYGIENE: INDEPENDENT
HYGIENE/GROOMING: INDEPENDENT
HYGIENE/GROOMING: SHOWER;INDEPENDENT
DRESS: INDEPENDENT
ORAL_HYGIENE: INDEPENDENT
DRESS: STREET CLOTHES;INDEPENDENT

## 2021-12-09 NOTE — CONSULTS
[facility]  Consult Note - Hospitalist Service     Date of Admission:  12/8/2021  Consult Requested by: Fei Grijalva CNP  Reason for Consult: hypertension     Assessment & Plan   Yoni Tran is a 21 year old male who presented to [facility] emergency department on 12/7/2021 with suicidal ideation and self injurious behavior.  Past medical history includes autism, ADHD, major depressive disorder, generalized anxiety disorder.  Patient reports 2 days ago attempting to injure himself by pulling a drawer out very fast and hitting his left hand.  He also hit his forehead against the counter.  He was evaluated by emergency room physician.  CT imaging not indicated.  Neurologically stable and transferred to inpatient behavioral health unit at [facility] 12/8/2021 for further management of mental illness.  Hospital medicine consulted for hypertension.      #Elevated blood pressure without diagnosis of hypertension  -Mildly elevated blood pressure this morning 140/92 with goal less than 140/90.  Blood pressures were more elevated last evening 148/101 and 156/104  -Past medical records show clinic visit October 1 with blood pressure 122/84 which is more patient's baseline.  He does take prazosin 4 mg at bedtime for psychiatric illness, which also has some benefit to manage blood pressure  -He had a headache yesterday, which he attributed to banging his head on the counter 2 days ago.  No headache currently.  No blurred vision.  Neurologically intact.  No lower extremity edema.  No history of hypertension or needing to be on antihypertensives.  No current blood work but does not have a history of kidney disease  -I have ordered hydralazine 25 mg as needed for systolic blood pressure greater than 150.  Blood pressures are better today and trending down to baseline.    #Mild head trauma  #Right sided neck pain  -Patient was evaluated by emergency room physician after hitting his forehead against  "a counter 2 days ago. Weld CT Head Injury/Trauma rule indicated patient does not meet criteria for CT head imaging.  On exam today patient was neurologically intact, blood pressure slightly elevated but otherwise vital signs stable.  I also reviewed Weld CT head and patient does not meet criteria for imaging today.  I discussed this with patient that we will continue observation.  Patient provided written education on concussion management.  Reviewed signs and symptoms of worsening concussion.  Recommended patient limit TV/screen time to 15 minutes/hour. If light-sensitive may rest with the lights dimmed.    -Right-sided neck examined.  No bruising or deformities seen.  Likely a mild muscle strain.  No imaging indicated.  -Patient is refusing scheduled Tylenol.  He may use ibuprofen or Tylenol as needed.  I have scheduled lidocaine gel twice daily x3 days for the right neck pain  -Medicine will sign off    #Left hand/wrist pain  - small bruise left wrist after patient reported slamming is hand in a kitchen drawere  - Imaging in ED was negative for wrist fracture  - CMS and ROM intact on exam  - Tylenol or ibuprofen for pain.  Ice pack to affected area as needed    #Major depressive disorder  #Self-injurious behavior  #Generalized anxiety disorder  #Autism spectrum disorder  #ADHD  -Psychiatry managing     Total time spent on the unit 40 minutes in reviewing the record, examination of patient, lab results and completing documentation. 22 minutes was spent in discussion and counseling with patient concerning diagnosis, medications, lab results and treatment plan. Care discussed and coordinated with patient and nurse.     [facility]  Securely message with the Vocera Web Console (learn more here)  Text page via [employer] Paging/Directory    ______________________________________________________________________    Chief Complaint    \"I had a headache all day " "yesterday. I think it's from hitting my head 2 days ago\"     History is obtained from the patient and chart review     History of Present Illness   Yoni Tran is a 21 year old male who is admitted to inpatient behavioral health unit at [facility] for management of depression and suicidal ideation.  Patient has a history of autism spectrum disorder, ADHD.  Hospital medicine consulted for management of elevated blood pressure.  Patient with no prior history of high blood pressure.  Patient takes the prazosin at night but otherwise has not been on any scheduled medicine for high blood pressure.  He had a headache all day yesterday but attributes this to banging his forehead on the counter 2 days ago.  He currently does not have a headache.  No blurred vision.  No lower extremity edema.  Patient reports having \"trouble with memory, kind of in a fog since 2 days ago.\"  Patient thinks this could be because he has not received his ADHD medications this morning but also says it might be from hitting his head. Has some right sided neck pain rated 4/10 which started yesterday, \"I think I slept on it wrong.\" Left hand, wrist area, has a small bruise form slamming his hand in the drawer 2 days ago. Rates this pain 2/10, no numbness or tingling in fingers.   Patient denies fever, chills, cough, chest pain, shortness of breath, nausea, vomiting.    Review of Systems   The 10 point Review of Systems is negative other than noted in the HPI    Past Medical History    I have reviewed this patient's medical history and updated it with pertinent information if needed.   No past medical history on file.    Past Surgical History   I have reviewed this patient's surgical history and updated it with pertinent information if needed.  No past surgical history on file.    Social History   I have reviewed this patient's social history and updated it with pertinent information if needed.  Social History     Tobacco Use     " Smoking status: Not on file     Smokeless tobacco: Not on file   Substance Use Topics     Alcohol use: Never     Drug use: Not on file       Family History   Family history unknown    Medications   I have reviewed this patient's current medications    Allergies   No Known Allergies    Physical Exam   Vital Signs: Temp: 98.6  F (37  C) Temp src: Oral BP: (!) 140/92 Pulse: 68   Resp: 18 SpO2: 99 % O2 Device: None (Room air)    Weight: 183 lbs 0 oz  General: Alert, calm, no apparent distress  HEENT: Normocephalic, dime sized bruise upper center of forehead, no laceration/skin intact, no signficant hematoma, mucous membranes pink and moist, oropharynx without exudate, lymph nodes free and mobile  Respiratory: Lung sounds clear bilaterally, non-labored  Cardiovascular: S1, S2, rhythm rate regular, negative murmur, negative lower extremity edema  Gastrointestinal: Bowel sounds positive x4, nondistended and non-tender  Musculoskeletal: small bruise left wrist, skin intact, cms/rom intact. Right neck; no deformity, skin intact, ROM intact   Neurological: Alert and oriented x3, CARMEN, EOMs intact, speech intact, moves all extremities equally, sensation intact    Data   No results found for this or any previous visit (from the past 24 hour(s)).
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
## 2021-12-09 NOTE — H&P
"PSYCHIATRY   HISTORY AND PHYSICAL     DATE OF SERVICE   12/9/2021         CHIEF COMPLAINT   \"I had an emotional breakdown\"       HISTORY OF PRESENT ILLNESS   This is a 21 year old male with history significant for major depressive disorder, WARREN, ADHD and Autism spectrum presented to the ED with his mother following concerns related to suicidal ideation. Current legal status is Voluntary. 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 Care coordination performed in details by obtaining collateral information from [facility] and [facility] records. In brief, was having difficulties managing his emotions that emanated from psychosocial stressors and worsening depression and met with his therapist who advised he should go the the ER for further psychiatric evaluation and inpatient admission. Patient reportedly slammed his head against the counter-top while aggressively pulled a drawer and injured his arm prior visit with his therapist.     Patient was seen and evaluated in the consult room by himself. Patient presented as anxious, mildly agitated, thought blocking with distractibility. Patient appeared visibly anxious and restless as evidenced by constantly pulling his hair at intervals throughout the interview process. Patient complained of worsening depression in the context of psychosocial stressors related to family dynamics, personal development and life stressors. Patient stated he was devastated after being diagnosed recently with Autism and ADHD while adjusting to the new reality of his parents divorce. Patient stated most times he feels as though he is not making any progress in life. Patient stated all he does mostly is to engage in media consumption. Patient reports " he had to slam his head on the countertop to express his emotions as he does not know how to handle the frustration anymore. Patient stated the injury he sustained while pulling the drawer was not as a result of intentional self-harm. Patient currently denies suicidal and homicidal thoughts. He contracted for safety on the unit. Patient stated he does not want to die, adding all he wanted is to be able to sit with a professional for one hour each day and have a solid plan about how to best manage his frustrations. Patient stated he does not do well in a group setting. Patient did report history of suicidal attempt that led to inpatient mental admission in November 2020, when he attempted to jump off [place] at the [facility]. No overt psychosis noted during this assessment. UDS was positive for Methamphetamines, Benzo, and THC. Patient states he uses Delta 8 infrequently.     Patient reports past medication trials to include but not limited to Abilify, Zyprexa, Seroquel, Remeron, Zoloft, Celexa, Prozac, Trintellix, Effexor, Duloxetine, Viibryd. I plan to care coordinate with the patient current OP Psychiatrist to obtain full medication history. Patient is currently taking Duloxetine 60 mg daily with the goal to titrate to 90 mg in about a week, Guanfacine 4 mg daily, Lamictal 250 daily, Vyvance 40 mg daily, Risperidone 1 mg at bedtime, Temazepam 30 mg at bedtime, Prazosin 4 mg at bedtime and Viibryd 20 mg daily with the goal to discontinue in about a week. Risperidone increased to 1.5 mg at bedtime to target any residual psychosis.   Hospitalist consult placed for elevated BP.   [facility] Crisis Assessment     Patient was assessed: in person  Patient location: [facility]     Referral Data and Chief Complaint  Patient is a 21 year old who uses he/him pronouns. Patient presented to the ED with mother Zuleika per therapist's recommendation. Patient is presenting to the ED for the following concerns:  "suicidal ideation.       Informed Consent and Assessment Methods     Patient is his own guardian. Writer met with patient and explained the crisis assessment process, including applicable information disclosures and limits to confidentiality, assessed understanding of the process, and obtained consent to proceed with the assessment. Patient was observed to be able to participate in the assessment as evidenced by verbal consent. Assessment methods included conducting a formal interview with patient, review of medical records, collaboration with medical staff, and obtaining relevant collateral information from family and community providers when available.     Narrative Summary of Presenting Problem and Current Functioning  What led to the patient presenting for crisis services, factors that make the crisis life threatening or complex, stressors, how is this disrupting the patient's life, and how current functioning is in comparison to baseline. How is patient presenting during the assessment.      Patient reports the big overarching problem is his depression over the past 4 years, with only 1 break of a summer.  Patient cites primary stressor as being diagnosed with ADHD and autism recently which put a lot of things in perspective, putting on his dad has alcohol abuse, parents  after 28 years, school entering their money and leaving them $40,000 left in the bank. Patient denies any new or acute stressors today. Patient reports this \"could have been any day in the past year, no particular reason today\".     Patient reports feeling as though he has \"no idea who he is, has a lack of life skills, feels he is an embarrassment, and no typical merits\".  Patient reports he has felt unable to function, has no consistent routine, no motivation to change, and when he tries to change she cannot keep it up.  Patient reports having difficulty verbalizing his emotions and reports he is saying the opposite of what he wants " "to be saying.  Patient believes that there is no point to our conversation.  Patient then asks to restart our assessment.     Patient endorses suicidal ideation, \"if I can't say no, yes\".  Patient reports he would kill himself \"in a way where it would not affect innocent bystanders\".  Patient reports he would want to snap his fingers and just die.  Patient gives one example as an explosion that would not hurt other people, another plan to bury himself.        History of the Crisis  Duration of the current crisis, coping skills attempted to reduce the crisis, community resources used, and past presentations.     Patient has history of inpatient mental health admission, most recently in 11/2020 in [place], [place] following an aborted suicide attempt at the [facility].  Patient's mother describes patient attending different stints at treatment facilities historically, patient did not follow up with or complete.  Patient has established individual therapy and medication management at this time.     Collateral Information   spoke with patient's mother Zuleika who was present in our emergency department: Patient's mother reports patient approached her this morning requesting to come to our emergency department.  Patient's mother attempted to de-escalate the situation and encouraged him to attend his already scheduled therapy appointment. Patient did not finish his appointment before therapist recommended further evaluation in our emergency department.  Patient then became agitated, slamming his head on his desk and pulling a drawer out aggressively.  Patient is described as a gentle person at baseline.  Patient's mother transported him to our emergency department per therapist recommendation and patient's request.          CHEMICAL DEPENDENCY HISTORY   History   Drug Use Not on file       Social History    Substance and Sexual Activity      Alcohol use: Never      History   Smoking Status     Not on " file   Smokeless Tobacco     Not on file       Treatment: Patient denies  Detox: Patient denies  Legal: Voluntary       PAST PSYCHIATRIC HISTORY   Psychiatrist: Yes, with [facility]  Therapist: Yes, Nathalie Callahan Psy.D  Case Management: No  Hospitalizations: No    History of Commitment: No  Past Medications: Abilify, Zyprexa, Seroquel, Remeron, Zoloft, Celexa, Prozac, Trintellix, Effexor, Duloxetine, Viibryd,    ECT:  No  Suicide Attempts/Gun Access: Yes, attempted to jump of [place] last year November/ Denies gun access  Community Supports: Mother       PAST MEDICAL HISTORY   No past medical history on file.    No past surgical history on file.    Primary Care Provider: Vasile Singleton  Medications:     DULoxetine  60 mg Oral Daily    Followed by     [START ON 12/15/2021] DULoxetine  90 mg Oral Daily     [START ON 12/10/2021] guanFACINE  4 mg Oral QAM     [START ON 12/10/2021] lamoTRIgine  250 mg Oral QAM     lidocaine   Topical BID     lisdexamfetamine  40 mg Oral QAM     prazosin  4 mg Oral At Bedtime     risperiDONE  1 mg Oral At Bedtime     temazepam  30 mg Oral At Bedtime     vilazodone  20 mg Oral Daily     Medications as needed: alum & mag hydroxide-simethicone, hydrALAZINE, hydrOXYzine, ibuprofen, OLANZapine **OR** OLANZapine, traZODone    ALLERGIES: Patient has no known allergies.       MEDICATIONS   No current outpatient medications on file.       Medication adherence issues: MS Med Adherence Y/N: No  Medication side effects: MEDICATION SIDE EFFECTS: no side effects reported  Benefit: Yes / No: Yes       ROS   Review of Systems   Constitutional: Negative for fever.   Respiratory: Negative for shortness of breath.    Cardiovascular: Negative for chest pain.   Gastrointestinal: Negative for abdominal pain.   Psychiatric/Behavioral: Positive for anxiety, behavioral problems, mood dysregulation    All other systems reviewed and are negative     FAMILY HISTORY   No family history on  "file.     Psychiatric: Father with history of Alcohol dependence  Chemical: Father with history of Alcohol dependence  Suicide: No       SOCIAL HISTORY   Social History     Socioeconomic History     Marital status: Single     Spouse name: Not on file     Number of children: Not on file     Years of education: Not on file     Highest education level: Not on file   Occupational History     Not on file   Tobacco Use     Smoking status: Not on file     Smokeless tobacco: Not on file   Substance and Sexual Activity     Alcohol use: Never     Drug use: Not on file     Sexual activity: Not on file   Other Topics Concern     Not on file   Social History Narrative     Not on file     Social Determinants of Health     Financial Resource Strain: Not on file   Food Insecurity: Not on file   Transportation Needs: Not on file   Physical Activity: Not on file   Stress: Not on file   Social Connections: Not on file   Intimate Partner Violence: Not on file   Housing Stability: Not on file       Born and Raised: [place]  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  Marital Status: Single  Children: No children  Legal:  Voluntary  Living Situation: Living arrangements - the patient lives with their family  ASSETS/STRENGTHS:  Being educated, have a job       MENTAL STATUS EXAM   Appearance:  Casually groomed  Mood:  \"frustrated\"  Affect: full range  was congruent to speech, mood congruent, intensity is heightened and reastive  Suicidal Ideation: PRESENT / ABSENT: absent   Homicidal Ideation: PRESENT / ABSENT: absent   Thought process: tangential and thought blocking   Thought content: denies suicidal ideation, violent ideation, delusions, magical thinking and paranoid ideation.   Fund of Knowledge: Average  Attention/Concentration: Easily distracted  Language ability:  Intact  Memory:  Immediate recall intact, Short-term memory intact and Long-term memory intact  Insight:  fair.  Judgement: fair  Orientation: Yes, " "x4  Psychomotor Behavior: denies tardive dyskinesia, dystonia or tics  Muscle Strength and Tone: MuscleStrength: Normal  Gait and Station: Normal       PHYSICAL EXAM   Vitals: BP (!) 140/92 (BP Location: Right arm, Patient Position: Sitting)   Pulse 68   Temp 98.6  F (37  C) (Oral)   Resp 18   Ht 1.854 m (6' 1\")   Wt 83 kg (183 lb)   SpO2 99%   BMI 24.14 kg/m      Physical exam as per Fei Grijalva CNP, Dated 12/9/21    Vitals and nursing note reviewed.   Constitutional:       General: he is not in acute distress.     Appearance: Normal appearance. he is not diaphoretic.   HENT:      Head: Atraumatic.      Mouth/Throat:      Pharynx: No oropharyngeal exudate.   Eyes:      General: No scleral icterus.     Pupils: Pupils are equal, round, and reactive to light.   Cardiovascular:      Rate and Rhythm: Normal rate and regular rhythm.      Heart sounds: Normal heart sounds.   Pulmonary:      Effort: No respiratory distress.      Breath sounds: Normal breath sounds.   Abdominal:      General: Bowel sounds are normal.      Palpations: Abdomen is soft.      Tenderness: There is no abdominal tenderness.   Musculoskeletal:         General: No tenderness.   Skin:     General: Skin is warm.      Findings: No rash.   Neurological:      General: No focal deficit present.      Mental Status: He is alert and oriented to person, place,       LABS   personally reviewed.   No visits with results within 2 Month(s) from this visit.   Latest known visit with results is:   No results found for any previous visit.     No results found for: PHENYTOIN, PHENOBARB, VALPROATE, CBMZ       ASSESSMENT   This is a 21 year old male with history significant for major depressive disorder, WARREN, ADHD and Autism spectrum presented to the ED with his mother following concerns related to suicidal ideation. He was mildly agitated and extremely anxious during the assessment. He does deny SI/HI/SIB as well as AVHs. No overt psychosis noted. Patient will " be maintained on his PTA meds with risperidone increased to 1.5 mg at bedtime.         DIAGNOSIS   Principal Problem:    Major depressive disorder, recurrent episode, moderate (H)    Active Problem List:  Patient Active Problem List   Diagnosis     Depression with suicidal ideation          PLAN   1. Education given regarding diagnostic and treatment options with risks, benefits and alternatives and adequate verbalization of understanding.  2. Admitted on a Voluntary status due to suicidal ideation  .    Precautions placed   3. Medications: 12/9/2021: PTA medications reviewed.    See HPI  4. Medications:  Hospital    Duloxetine 60 mg daily    Guanfacine 4 mg daily    Lamictal 250 mg daily    Vyvance 40 mg daily    Risperidone 1.5 mg at bedtime    Viibryd 20 mg daily    Temazepam 30 mg at betime    Prasosine 4 mg at bedtime   5. Consultations:    Hospitalist to follow as needed. Hospitalist consult placed for elevated BP    .  6. Structure and Supervision    Unit 4500    Barriers to Discharge: Exacerbation of symptoms  7.   is following in regards to collecting and reviewing collateral information, referrals and disposition planning.    Legal:  Voluntary    Referrals:  SW    Care Coordination:  Plan to coordinate care with the OP Psychiatrist regarding medication management    Placement:  Home with family     Anticipated Discharge: 3-5 days  Further treatment programming to be determined throughout the hospital course.    Risk Assessment: [facility] RISK ASSESSMENT: Patient able to contract for safety and Patient on precautions    This note was created with help of Dragon dictation system. Grammatical / typing errors are not intentional.    SOCO Sanchez CNP       CERTIFICATION   Initial Certification I certify that the inpatient psychiatric facility admission was medically necessary for treatment which could   reasonably be expected to improve the patient s condition.                                        I estimate 3-5 days of hospitalization is necessary for proper treatment of the patient. My plans for post-hospital care for this patient are home with family.                                       SOCO Sanchez CNP     -     12/9/2021     -     12:58 PM

## 2021-12-09 NOTE — PHARMACY-ADMISSION MEDICATION HISTORY
Admission medication history completed at [facility] on 12/7/21. Please refer to that note for details. Thanks.       Rodo Metzger, PharmD

## 2021-12-09 NOTE — PLAN OF CARE
Problem: Suicide Risk  Goal: Absence of Self-Harm  Outcome: Improving     Problem: Sleep Disturbance  Goal: Adequate Sleep/Rest  Outcome: Improving     No c/o pain or discomfort this shift. Pt. Slept greater than 7 hours. Safety checks completed every 15 minutes per policy. Suicide Precautions continued. No suicidal ideation reported this shift. Pt. Continues self injurious precautions. No SIB noted this shift.

## 2021-12-09 NOTE — PHARMACY-MEDICATION REGIMEN REVIEW
Pharmacy Note: Admission Drug Regimen Review     Please see below Pharmacy - Admission Medication History Note from 12/7/21    Admission drug regimen review has been completed. The following medication issues were identified.        The following medication were continued on LTACH adm/transfer - prazosin, risperidone, temazepam, trazodone.     The following medications were ordered at [facility] but not continued on LTACH adm:  Duloxetine (taper off), guanFACINE, Lamictal, Lisdexamfetamine (VYVANSE), vilazodone (taper off)     The following PTA medications were changed at [facility]: none         The following PTA medications were not ordered at [facility]: none       Thank you,  Emilia Thapa PharmD

## 2021-12-09 NOTE — PROGRESS NOTES
12/09/21 1400   Engagement   Intervention Group   Topic Detail OT: Collaging activity for creative self-expression, coping through distraction, healthy leisure exploration, and emotional and occupational wellness   Attendance Did not attend

## 2021-12-09 NOTE — PLAN OF CARE
"  Problem: Behavioral Health Plan of Care  Goal: Plan of Care Review  Outcome: No Change     Problem: Suicide Risk  Goal: Absence of Self-Harm  Outcome: No Change     Problem: Suicidal Behavior  Goal: Suicidal Behavior is Absent or Managed  Outcome: Improving    Patient offered complaints of discomfort to right neck area this morning. Prn Tylenol and lidocaine cream offered and patient declined. Patient endorsed anxiety 7/10 depression 7/10 and denied all other psych symptoms. Patient appears to be very anxious and agitated this morning. Prn Atarax and Zyprexa administered with effectiveness. Patient feels frustrated, states,\" I feel frustrated because I'm not able to express my emotions well and I feel like no one gets me\". Writer listened and acknowledge patient's concerns and reassured him. Contracts for safety and med compliant.  Patient offered complaints of a headache 7/10 pain this afternoon and Tylenol administered with effect.     "

## 2021-12-09 NOTE — PROGRESS NOTES
Pt. arrrived to [facility] at 1855. Pt. Was seen by therapist today was told to go to ED for psych eval. Pt. Reported suicidal thoughts with no plan. Per patient report, in November of 2021, pt was hospitalized for suicidal ideation with plan to jump off building/bridge and left note to friend. Pt. Slammed head on countertop while at home and hit hand on wall. Pt. Stated he was frustrated and didn't know how to express his emotions. Xray negative to left wrist and hand. Recently diagnosed with autism and pt overwhelmed by diagnosis. Other stressors include parents , father being alcoholic with cirrhosis of the liver and possibly dying, going to college and unable to pass classes, family finances, and difficulty making friends.  Pt. Stated he has been struggling with depression and feels like he was not functioning in his daily life. Pt. Stated he has been in residential outpatient in the past for treatment for depression. Rated depression 10/10 anxiety 7/10. Pt. Denied hallucinations. Pt. Admitted to self injurious behaviors by scratching self out of frustration a few months ago and recently slammed head on dresser.  Pt. Voluntary and stated he wants to be able to process, control and understand his emotions. Pt. Pleasant, cooperative, calm, and appreciative. Pt. Contracted for safety, was given tour of unit, down to gown completed by other staff, and was oriented to unit and guidelines. Pt. Refused to eat dinner stating he was not hungry. Pt. Rated pain 3/10 to right side of neck stating he was given an uncomfortable chair to sleep in last night. Pt. Received PRN atarax 25mg for anxiety and received all his scheduled HS medications. Pt. Med compliant and knows and understands all his medications. Pt. Stated his goals is to be able to process his emotions, understand his emotions, and be able to control them. Pt. 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 Pt. Currently living at home with his mother who  is his primary support system. UDS positive and covid test negative. Pt. Stated he had a recent weight loss from decreased appetite. Pt. Has clear thinking, admits and accepts illness with appropriate insight, speech is clear, affect is sad. Pt. Cooperative with all assessment. Pt. Noted to have increased Blood pressures on admission. 156/104, 160/109. Pt. Received scheduled prazosin 4mg for nightmares. Recheck of B/P 140/92 and 147/96. MD notified and new order for hospitalist consult.

## 2021-12-09 NOTE — PLAN OF CARE
Problem: Behavioral Health Plan of Care  Goal: Patient-Specific Goal (Individualization)  Outcome: Improving  Flowsheets (Taken 12/9/2021 1052)  Patient Vulnerabilities: poor impulse control  Patient-Specific Goals (Include Timeframe): Pt wants to learn to regulate his emotions  Patient Personal Strengths:   family/social support   insight into illness/situation       BEHAVIORAL TEAM DISCUSSION    Participants: Provider: CATHERINE, RN: GAGE, : GOKUL, Pharmacy: [facility], OT: AR  Progress: Pt is progressing  Anticipated length of stay: TBD, pending stabilization  Continued Stay Criteria/Rationale: Symptom stabilization, med management, care coordination  Medical/Physical: Hospitalist consult for high blood pressure  Precautions:   Behavioral Orders   Procedures    Code 1 - Restrict to Unit    Routine Programming     As clinically indicated    Self Injury Precaution    Status 15     Every 15 minutes.    Suicide precautions     Patients on Suicide Precautions should have a Combination Diet ordered that includes a Diet selection(s) AND a Behavioral Tray selection for Safe Tray - with utensils, or Safe Tray - NO utensils       Plan: TBD, CTC make appropriate referrals

## 2021-12-09 NOTE — PROGRESS NOTES
CLINICAL NUTRITION SERVICES - ASSESSMENT NOTE     Nutrition Prescription    RECOMMENDATIONS FOR MDs/PROVIDERS TO ORDER:    Malnutrition :  % Intake: < 75% for > 7 days (moderate)  % Weight Loss: Weight loss does not meet criteria  Subcutaneous Fat Loss: None observed  Muscle Loss: None observed  Fluid Accumulation/Edema: None noted  Malnutrition Diagnosis: Patient does not meet two of the established criteria necessary for diagnosing malnutrition      Malnutrition Diagnosis: Patient does not meet two of the above criteria necessary for diagnosing malnutrition    Recommendations already ordered by Registered Dietitian (RD):  Ensure enlive daily per pt request    Future/Additional Recommendations:       REASON FOR ASSESSMENT  Yoni Tran is a/an 21 year old male assessed by the dietitian for Admission Nutrition Risk Screen for positive weight loss and poor intake  History of MDD, WARREN, autism  Admitted for SI    NUTRITION HISTORY      Food allergies/intolerances: NKFA     Cultural needs or preferences none noted    Patient is on a Regular diet at home.    Typical food/fluid intake:decreased PTA per risk screen. Pt reports not much appetite and eating ~ 1 meal /d recently at home. Pt reports he is filling out his menu here but not completely as does not always like the food choices. Pt interested in ONS at breakfast    Supplements at home:none    Living situation: lives with his mother    Factors affecting nutrition intake include: decreased appetite, altered mental status     CURRENT NUTRITION ORDERS  Diet: Regular    Intake/Tolerance:     Per flow sheet review, 100% intake for documented meals x 1 noted so far    LABS: reviewed.  MEDICATIONS    Medications reviewed    DULoxetine  60 mg Oral Daily    Followed by     [START ON 12/15/2021] DULoxetine  90 mg Oral Daily     [START ON 12/10/2021] guanFACINE  4 mg Oral QAM     [START ON 12/10/2021] lamoTRIgine  250 mg Oral QAM     lidocaine   Topical BID      "lisdexamfetamine  40 mg Oral QAM     prazosin  4 mg Oral At Bedtime     risperiDONE  1.5 mg Oral At Bedtime     temazepam  30 mg Oral At Bedtime     vilazodone  20 mg Oral Daily                 ANTHROPOMETRICS  Height: 6' 1\"  Weight: 83 kg   Body mass index is 24.14 kg/m .  Weight Status:  Normal BMI    Weight History:   Wt Readings from Last 10 Encounters:   12/08/21 83 kg (183 lb)   12/07/21 87.5 kg (193 lb)     Weight 85.8 kg (189 lb 3.2 oz) 10/01/2021 2:31 PM CDT [facility]       Weight 92.4 kg (203 lb 11.2 oz) 11/08/2018 2:33 PM CST [facility]     Weight 81.6 kg (180 lb) 07/02/2019 3:05 AM CDT [facility]       Weight 90.3 kg (199 lb) 04/26/2019 3:00 PM CDT [facility]       ? 5% loss in one day ? Per Epic entries- unlikely accurate  3 % weight loss in 2 months, as noted above, not  clinically significant  Unable to assess further  weight changes in the last year d/t limited data    Dosing Weight: 83 kg current     ASSESSED NUTRITION NEEDS  Estimated Energy Needs: 8356-4538 kcals/day (25 - 30 kcals/kg)  Justification: Maintenance  Estimated Protein Needs: 85-95 grams protein/day (1 - 1.2 grams of pro/kg)  Justification: Maintenance  Estimated Fluid Needs:  (1 mL/kcal)   Justification: Maintenance    PHYSICAL FINDINGS  See malnutrition section above.     Nba score :Nutrition 4 Total Score 23    GI Status/Output:  Last BM:WDL  per nursing   No issues noted  No intake/output data recorded.    NUTRITION DIAGNOSIS  No nutrition diagnosis at this time       INTERVENTIONS  Implementation     Medical food supplement therapy   Conitnue Current Nutrition Rx    Goals  Maintain Weight   Intake > 75% of meals    Monitoring/Evaluation  Progress toward goals will be monitored and evaluated per protocol.   "

## 2021-12-09 NOTE — PROGRESS NOTES
Patient admit to the unit at 1855 from [facility]. Patient arrived via stretcher accompanied by EMT. Down to gown completed. Patient is pleasant and cooperative.

## 2021-12-09 NOTE — PROGRESS NOTES
12/09/21 1000   Engagement   Intervention Group   Topic Detail OT: Movement group (Exercise dice) to promote physical wellness, coping with symptoms, sequencing/attention, and opportunity for positive social interactions.    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 Pt presents as somewhat anxious though positive response to movement and socialization with peers.

## 2021-12-10 PROCEDURE — 250N000013 HC RX MED GY IP 250 OP 250 PS 637: Performed by: NURSE PRACTITIONER

## 2021-12-10 PROCEDURE — 128N000001 HC R&B CD/MH ADULT

## 2021-12-10 PROCEDURE — 99231 SBSQ HOSP IP/OBS SF/LOW 25: CPT | Performed by: NURSE PRACTITIONER

## 2021-12-10 RX ORDER — GABAPENTIN 100 MG/1
200 CAPSULE ORAL 3 TIMES DAILY
Status: DISCONTINUED | OUTPATIENT
Start: 2021-12-10 | End: 2021-12-15 | Stop reason: HOSPADM

## 2021-12-10 RX ADMIN — TEMAZEPAM 30 MG: 15 CAPSULE ORAL at 21:36

## 2021-12-10 RX ADMIN — LISDEXAMFETAMINE DIMESYLATE 40 MG: 10 CAPSULE ORAL at 08:36

## 2021-12-10 RX ADMIN — PRAZOSIN HYDROCHLORIDE 4 MG: 1 CAPSULE ORAL at 21:35

## 2021-12-10 RX ADMIN — VILAZODONE HYDROCHLORIDE 20 MG: 20 TABLET ORAL at 08:36

## 2021-12-10 RX ADMIN — GUANFACINE 4 MG: 2 TABLET, EXTENDED RELEASE ORAL at 08:36

## 2021-12-10 RX ADMIN — LAMOTRIGINE 250 MG: 150 TABLET ORAL at 08:36

## 2021-12-10 RX ADMIN — RISPERIDONE 1.5 MG: 0.5 TABLET ORAL at 21:35

## 2021-12-10 RX ADMIN — GABAPENTIN 200 MG: 100 CAPSULE ORAL at 21:35

## 2021-12-10 RX ADMIN — HYDROXYZINE HYDROCHLORIDE 25 MG: 25 TABLET, FILM COATED ORAL at 08:46

## 2021-12-10 RX ADMIN — DULOXETINE 60 MG: 60 CAPSULE, DELAYED RELEASE ORAL at 08:36

## 2021-12-10 RX ADMIN — GABAPENTIN 200 MG: 100 CAPSULE ORAL at 14:15

## 2021-12-10 RX ADMIN — IBUPROFEN 400 MG: 200 TABLET, FILM COATED ORAL at 16:15

## 2021-12-10 RX ADMIN — HYDROXYZINE HYDROCHLORIDE 25 MG: 25 TABLET, FILM COATED ORAL at 20:25

## 2021-12-10 ASSESSMENT — ACTIVITIES OF DAILY LIVING (ADL)
LAUNDRY: WITH SUPERVISION
DRESS: INDEPENDENT
ORAL_HYGIENE: INDEPENDENT
HYGIENE/GROOMING: INDEPENDENT

## 2021-12-10 NOTE — PLAN OF CARE
Problem: Suicidal Behavior  Goal: Suicidal Behavior is Absent or Managed  Outcome: Improving     Problem: Suicide Risk  Goal: Absence of Self-Harm  Outcome: Improving.  Patient  calm and pleasant upon approach. Endorses anxiety 8/10, depression 6/10, but denies pain, and all other psych symptoms and contracts for safety.  Prn Atarax administered with good effect. Ate 100% both meals with adequate fluids intake. Patient attended and participate in therapy. No behaviors observed. Will continue plan of care.

## 2021-12-10 NOTE — PROGRESS NOTES
12/10/21 1421   Engagement   Intervention Group   Topic Symptom management;Leisure education   Topic Detail Creative expression: scratch art for coping skill development, healthy leisure, distraction activity   Attendance Attended   Patient Response Demonstrated understanding of materials provided;Accepted feedback;Was respectful;Positive attitude   Concentrated on Task 30 - 45 min  (left early to meet with provider but returned)   Cognition Goal-directed;Follows through with task   Mood/Affect Content;Congruent;Bright   Social/Behavioral Cooperative;Engaged   Thought Content Reality oriented   Goals addressed in session today pleasant, respectful, used supplies appropriately. Engaged in small talk about cars/electric vehicles

## 2021-12-10 NOTE — PROGRESS NOTES
Patient's mother Natasha Mullen [phone] called with multiple concerns that she needed explanations for includin). Patient's room was cold  2).Daily medications were given at 1pm on 21  3). Mom was asked to take some items (clothes, blanket, shoes and books) back home.   4). Visiting policy is not favorable and mom requested to allow her  spend at least one hour with son to play games each time she visits.    Writer deliberated with Natasha and she understood that the visiting policy can not be changed for now due to covid infection and safety. Clothing rule was explained. Maintenance has worked on turning up the heat in the patient's room.  Writer met with patient who stated that  He was overwhelmed yesterday and he over expressed these concerns to his mom, but doing better today.   Mom will bring in a soft cover sudoku game, bible, card games and journal. He has a set of clothes and mom can drop off a 2nd set (sweat shirt, pants and t-shirt, slippers/shoe) for patient.   Patient agrees to do laundry and wear his own underwear.    Patient appeared pleasant in his room. He reported that he does better with females around him especially in groups because of his autism diagnosis.   Staff will monitor patient for safety.

## 2021-12-10 NOTE — PLAN OF CARE
Problem: Suicidal Behavior  Goal: Suicidal Behavior is Absent or Managed  Outcome: Improving  Flowsheets (Taken 12/10/2021 0029)  Mutually Determined Action Steps (Suicidal Behavior Absent/Managed): verbalizes safety check rationale     Problem: Suicide Risk  Goal: Absence of Self-Harm  Outcome: Improving     Problem: Sleep Disturbance  Goal: Adequate Sleep/Rest  Outcome: Improving  Safety maintained per protocol. Pt slept well through the night. No signs respiratory distress noted this shift. Pt denies pain, SI/HI and all other psych symptoms. Pt continues on self injury and suicidal precaution.  No behavior observed or reported.

## 2021-12-10 NOTE — PLAN OF CARE
Problem: Behavioral Health Plan of Care  Goal: Plan of Care Review  Outcome: Improving     Problem: Suicidal Behavior  Goal: Suicidal Behavior is Absent or Managed  Outcome: Improving     Problem: Suicide Risk  Goal: Absence of Self-Harm  Outcome: Improving     Problem: Sleep Disturbance  Goal: Adequate Sleep/Rest  Outcome: Improving   Patient endorses both anxiety and depression and rates at 7/10 respectively, but denies pain, all other psych symptoms and contracts for safety. Atarax administered with relief. C/o BP of 173/100. Hydralazine administered with relief. Pt is isolative in room, but comes out for meals. Pt is pleasant with a flat and blunted affect. He remains calm, cooperative, and medications compliant. Will continue with same plan of care.

## 2021-12-10 NOTE — PROGRESS NOTES
"PSYCHIATRY  PROGRESS NOTE     DATE OF SERVICE   12/10/2021         CHIEF COMPLAINT   \"feeling much better\"       SUBJECTIVE   Nursing reports: Patient was visible in the milieu social attending groups and social with select peers. Patient was medication compliant. He denies all psych symptoms.     reports:  SW talked with patient about possible options for services after discharge, including [facility], which is an IOP specifically for people ages 18-25 with Autism Spectrum Disorder, and a few other IOPs. Writer and patient also talked about options such as [facility], to provide patient with additional support at home with goal-setting and mental health support. Patient has information about all the programs and stated he will look over his options today to decide what fits best. At this time he noted the [facility] program is \"exactly what I need       OBJECTIVE   Chart reviewed and patient assessed. Patient was seen and evaluated in the comfort room by himself. Upon patient interview, patient reports he is doing better than when he first got here. He stated his sleep was better than than the first night, saying it must have been due to risperidone titration. Patient stated he is more focussed and organized. Patient who denies suicidal and homicidal thoughts states he is more hopeful than previous days. Patient stated he is excited about the program options that the SW suggested to him, saying he would look into them and choose one that best fist his situation. Patient appeared to be toleration risperidone titration well with good efficacy. Patient complained of neck spasm, saying Gabapentin has been effective for the pain in the past. He was started on Gabapentin 200 mg daily targeting neck muscle spasm. No psychosis observed during this assessment.        MEDICATIONS   Medications:  Scheduled Meds:    DULoxetine  60 mg Oral Daily    " "Followed by     [START ON 12/15/2021] DULoxetine  90 mg Oral Daily     gabapentin  200 mg Oral TID     guanFACINE  4 mg Oral QAM     lamoTRIgine  250 mg Oral QAM     lidocaine   Topical BID     lisdexamfetamine  40 mg Oral QAM     prazosin  4 mg Oral At Bedtime     risperiDONE  1.5 mg Oral At Bedtime     temazepam  30 mg Oral At Bedtime     vilazodone  20 mg Oral Daily     Continuous Infusions:  PRN Meds:.alum & mag hydroxide-simethicone, hydrALAZINE, hydrOXYzine, ibuprofen, OLANZapine **OR** OLANZapine, traZODone    Medication adherence issues: MS Med Adherence Y/N: No  Medication side effects: MEDICATION SIDE EFFECTS: no side effects reported  Benefit: Yes / No: Yes       ROS   A comprehensive review of systems was negative. Except noted in HPI       MENTAL STATUS EXAM   Vitals: BP (!) 141/90 (BP Location: Right arm)   Pulse 100   Temp 98  F (36.7  C) (Oral)   Resp 16   Ht 1.854 m (6' 1\")   Wt 83 kg (183 lb)   SpO2 98%   BMI 24.14 kg/m      Appearance:  Casually groomed  Mood:  \"better\"  Affect: full range  was congruent to speech, intensity is normal and reactive  Suicidal Ideation: PRESENT / ABSENT: absent   Homicidal Ideation: PRESENT / ABSENT: absent   Thought process: concrete   Thought content: denies suicidal ideation, violent ideation, delusions, magical thinking and paranoid ideation.   Fund of Knowledge: Average  Attention/Concentration: Fair  Language ability:  Intact  Memory:  Immediate recall intact, Short-term memory intact and Long-term memory intact  Insight:  fair.  Judgement: fair  Orientation: Yes, x4  Psychomotor Behavior: denies tardive dyskinesia, dystonia or tics  Muscle Strength and Tone: MuscleStrength: Normal  Gait and Station: Normal       LABS   personally reviewed.     No results found for: PHENYTOIN, PHENOBARB, VALPROATE, CBMZ       DIAGNOSIS   Principal Problem:    Major depressive disorder, recurrent episode, moderate (H)    Active Problem List:  Patient Active Problem List "   Diagnosis     Depression with suicidal ideation     Major depressive disorder, recurrent episode, moderate (H)     ADHD (attention deficit hyperactivity disorder)     Active autistic disorder          PLAN   1. Ongoing education given regarding diagnostic and treatment options with risks, benefits and alternatives and adequate verbalization of understanding.  2.Medications: Duloxetine 60 mg daily                          Viibryd 20 mg daily                          Vyvance 40 mg daily                          Gabapentin 200 mg TID                          Guanfacine 4 mg daily                          Lamictal 250 mg daily                          Risperidone 1.5 mg at bedtime                          Prazosin 4 mg at bedtime                          Temazepam 30 mg at bedtime  3. Hospitalist consult: Hospitalist to see patient as needed. Hospitalist is aware of Elevated BP  4. Legal: Voluntary  5.  to follow regarding collecting and reviewing collateral information, referrals and disposition planning.     Risk Assessment: [facility] RISK ASSESSMENT: Patient able to contract for safety and Patient on precautions    Coordination of Care:   Treatment Plan reviewed and physician signed, Care discussed with Care/Treatment Team Members, Chart reviewed and Patient seen      Re-Certification I certify that the inpatient psychiatric facility services furnished since the previous certification were, and continue to be, medically necessary for, either, treatment which could reasonably be expected to improve the patient s condition or diagnostic study and that the hospital records indicate that the services furnished were, either, intensive treatment services, admission and related services necessary for diagnostic study, or equivalent services.     I certify that the patient continues to need, on a daily basis, active treatment furnished directly by or requiring the supervision of inpatient psychiatric facility  personnel.   I estimate 3-5 days of hospitalization is necessary for proper treatment of the patient. My plans for post-hospital care for this patient are  home with family     SOCO Sanchez CNP    -     12/10/2021     -     1:40 PM    Total time  15 minutes with > 50%spent on coordination of cares and psycho-education.    This note was created with help of Dragon dictation system. Grammatical / typing errors are not intentional.    SOCO Sanchez CNP

## 2021-12-10 NOTE — PLAN OF CARE
"Assessment/Intervention/Current Symptoms and Care Coordination  Writer met with the patient to check in and consulted with the provider. Patient presented as calm and pleasant, reporting he is feeling much better than yesterday. He continues to vocalize significant self esteem issues and negative self-talk. He has insight into his mental health and recognizes that he could benefit from consistent therapy and additional support to work through some of these things. Writer talked with him about possible options for services after discharge, including [facility], which is an IOP specifically for people ages 18-25 with Autism Spectrum Disorder, and a few other IOPs. Writer and patient also talked about options such as [facility], to provide patient with additional support at home with goal-setting and mental health support. Patient has information about all the programs and stated he will look over his options today to decide what fits best. At this time he noted the [facility] program is \"exactly what I need\".     Discharge Plan or Goal  Home with outpatient support    Barriers to Discharge   Symptom stabilization, med management, and care coordination    Referral Status  None at this time    Legal Status  Voluntary    Tali Ziegler [facility], 12/10/2021, 10:19 AM     "

## 2021-12-10 NOTE — PROGRESS NOTES
Chart review today  Patient was in [facility] area playing cards with peers  I spoke to his nurse who stated that patient has not complained of neck pain this morning. Slept through the night. Neurologically stable. Vital signs stable. BP mildly elevated 141/90 with prn hydralazine available as needed for sbp >150.   See my note from yesterday.   Medicine will sign off    [facility] medicine team  [facility]

## 2021-12-10 NOTE — PROGRESS NOTES
"Occupational Therapy     Patient was introduced to the role of occupational therapy and oriented to the process of occupational therapy services on the unit, including function of groups. Patient was given the Occupational Therapy Questionnaire to complete. Pt verbalized understanding of OT role, is pleasant and agreeable to complete form. Reports that he is doing better today \"after getting back on my medication.\" Care plan initiated for patient who admitted on 12/8/21. Occupational therapy will invite patient to group and encourage active participation. OT will follow up with assessment and address any questions, needs, or concerns    12/10/2021     "

## 2021-12-10 NOTE — PROGRESS NOTES
12/10/21 0915   Engagement   Intervention Group   Topic Detail OT: Education on social wellness and interactive social activity (Nidia Ante) to increase social engagement, taking turns, listening to others, reminiscing, short term memory, connecting with others, and overall social wellness   Attendance Attended   Patient Response Demonstrated understanding of materials provided;Was respectful;Prosocial behavior;Contributes to conversation   Concentrated on Task duration of group   Cognition Goal-directed;Follows through with task   Mood/Affect Pleasant;Content   Social/Behavioral Engaged;Cooperative   Thought Content Reality oriented     Pt sat among peers to complete activity and engaged in reciprocal social interactions with peers. Pt respectfully listened to peers and answered questions about himself. Pt asked follow-up questions of peers and was able to recall two new things he learned about peers at the table. Pt reported he enjoyed the activity.

## 2021-12-11 PROCEDURE — 128N000001 HC R&B CD/MH ADULT

## 2021-12-11 PROCEDURE — 250N000013 HC RX MED GY IP 250 OP 250 PS 637: Performed by: NURSE PRACTITIONER

## 2021-12-11 PROCEDURE — 90853 GROUP PSYCHOTHERAPY: CPT

## 2021-12-11 RX ORDER — LIDOCAINE 50 MG/G
OINTMENT TOPICAL 3 TIMES DAILY PRN
Status: DISCONTINUED | OUTPATIENT
Start: 2021-12-11 | End: 2021-12-15 | Stop reason: HOSPADM

## 2021-12-11 RX ADMIN — LISDEXAMFETAMINE DIMESYLATE 40 MG: 10 CAPSULE ORAL at 08:19

## 2021-12-11 RX ADMIN — RISPERIDONE 1.5 MG: 0.5 TABLET ORAL at 22:08

## 2021-12-11 RX ADMIN — LIDOCAINE: 50 OINTMENT TOPICAL at 12:30

## 2021-12-11 RX ADMIN — GABAPENTIN 200 MG: 100 CAPSULE ORAL at 08:19

## 2021-12-11 RX ADMIN — TEMAZEPAM 30 MG: 15 CAPSULE ORAL at 22:08

## 2021-12-11 RX ADMIN — DULOXETINE 60 MG: 60 CAPSULE, DELAYED RELEASE ORAL at 08:19

## 2021-12-11 RX ADMIN — VILAZODONE HYDROCHLORIDE 20 MG: 20 TABLET ORAL at 08:22

## 2021-12-11 RX ADMIN — GUANFACINE 4 MG: 2 TABLET, EXTENDED RELEASE ORAL at 08:20

## 2021-12-11 RX ADMIN — LAMOTRIGINE 250 MG: 150 TABLET ORAL at 08:21

## 2021-12-11 RX ADMIN — GABAPENTIN 200 MG: 100 CAPSULE ORAL at 20:15

## 2021-12-11 RX ADMIN — LIDOCAINE 0.5 G: 50 OINTMENT TOPICAL at 22:18

## 2021-12-11 RX ADMIN — GABAPENTIN 200 MG: 100 CAPSULE ORAL at 13:59

## 2021-12-11 RX ADMIN — HYDROXYZINE HYDROCHLORIDE 25 MG: 25 TABLET, FILM COATED ORAL at 18:00

## 2021-12-11 RX ADMIN — HYDRALAZINE HYDROCHLORIDE 25 MG: 25 TABLET, FILM COATED ORAL at 09:13

## 2021-12-11 RX ADMIN — PRAZOSIN HYDROCHLORIDE 4 MG: 1 CAPSULE ORAL at 22:07

## 2021-12-11 ASSESSMENT — ACTIVITIES OF DAILY LIVING (ADL)
DRESS: INDEPENDENT
HYGIENE/GROOMING: INDEPENDENT
DRESS: INDEPENDENT
LAUNDRY: WITH SUPERVISION
ORAL_HYGIENE: INDEPENDENT
ORAL_HYGIENE: INDEPENDENT
HYGIENE/GROOMING: INDEPENDENT

## 2021-12-11 NOTE — PROGRESS NOTES
Pt was focused on his project and demonstrated good attention to detail.  Pt was pleasant and invested in the outcome of his work.  He was social with peers and accepting of positive feedback.  Pt also initiated clean up of his workspace at the end of the group session.       12/11/21 1029   Engagement   Intervention Group   Topic Detail Detailed scratch art for concentration, creative expression, coping, symptom mangement, healthy leisure, adhering to boundaries and socialization   Attendance Attended   Patient Response Demonstrated understanding of materials provided;Accepted feedback;Was respectful   Concentrated on Task duration of group   Cognition Goal-directed   Mood/Affect Pleasant   Social/Behavioral Engaged

## 2021-12-11 NOTE — PLAN OF CARE
Patient slept through the night with no issue. Safety check completed every 15 minutes. No respiratory distress noted or observed. Patient is still in bed sleeping. Will continue to monitor pt.  Problem: Sleep Disturbance  Goal: Adequate Sleep/Rest  Outcome: Improving

## 2021-12-11 NOTE — PLAN OF CARE
Problem: Behavioral Health Plan of Care  Goal: Plan of Care Review  Recent Flowsheet Documentation  Taken 12/11/2021 1300 by Ivonne Sanderson, RN  Plan of Care Reviewed With: patient  Patient Agreement with Plan of Care: agrees     Problem: Behavioral Health Plan of Care  Goal: Develops/Participates in Therapeutic [place] to Support Successful Transition  Intervention: Foster Therapeutic [place]  Recent Flowsheet Documentation  Taken 12/11/2021 1300 by Ivonne Sanderson, RN  Trust Relationship/Rapport: care explained     Patient has been calm and cooperative. Patient isolates to room but comes out for meals. Attended group today. Patient endorses anxiety 5/10 and depression 6/10. Patient denies SI, SIB, HI, hallucinations and all other psych symptoms. Continues to have neck pain and spasm. Gave patient lidocaine cream with relief. Contract for safety. Medication compliant.     Patient's blood pressure was 152/91 per protocol patient was given prn Hydralazine 25mg. Recheck blood pressure was 142/92.

## 2021-12-11 NOTE — PLAN OF CARE
Problem: Behavioral Health Plan of Care  Goal: Optimized Coping Skills in Response to Life Stressors  Outcome: Improving     Problem: Suicidal Behavior  Goal: Suicidal Behavior is Absent or Managed  Outcome: Improving     Problem: Suicide Risk  Goal: Absence of Self-Harm  Outcome: Improving     Patient  presents as calm and pleasant upon approach.  Affect is flat and blunted, but brightens up during assessment. Endorses anxiety 5/10, depression 6/10. Denied all other psych symptoms. Contracted for safety. Patient did not attend community meeting. Stayed in room and read. Patient complained of intermittent headache and requested for prn Ibuprofen. Prn given and was effective on reassessment. BP was slightly elevated at 142/95 (sitting), 130/92 (Standing). Did not meet criteria for prn. Patient remains asymptomatic. Will continue to monitor.

## 2021-12-11 NOTE — PROGRESS NOTES
Writer to assume patient care at 1930. Patient has had no behavioral concerns for the remainder of the evening shift. Patient has been medication compliant and cooperative with cares. Patient has been pleasant and appropriate with interactions.

## 2021-12-12 PROCEDURE — 250N000013 HC RX MED GY IP 250 OP 250 PS 637: Performed by: NURSE PRACTITIONER

## 2021-12-12 PROCEDURE — 128N000001 HC R&B CD/MH ADULT

## 2021-12-12 RX ADMIN — LAMOTRIGINE 250 MG: 150 TABLET ORAL at 08:18

## 2021-12-12 RX ADMIN — GABAPENTIN 200 MG: 100 CAPSULE ORAL at 13:06

## 2021-12-12 RX ADMIN — GABAPENTIN 200 MG: 100 CAPSULE ORAL at 22:29

## 2021-12-12 RX ADMIN — GABAPENTIN 200 MG: 100 CAPSULE ORAL at 08:19

## 2021-12-12 RX ADMIN — RISPERIDONE 1.5 MG: 0.5 TABLET ORAL at 22:29

## 2021-12-12 RX ADMIN — TEMAZEPAM 30 MG: 15 CAPSULE ORAL at 22:29

## 2021-12-12 RX ADMIN — PRAZOSIN HYDROCHLORIDE 4 MG: 1 CAPSULE ORAL at 22:29

## 2021-12-12 RX ADMIN — VILAZODONE HYDROCHLORIDE 20 MG: 20 TABLET ORAL at 08:20

## 2021-12-12 RX ADMIN — LIDOCAINE: 50 OINTMENT TOPICAL at 18:05

## 2021-12-12 RX ADMIN — GUANFACINE 4 MG: 2 TABLET, EXTENDED RELEASE ORAL at 08:19

## 2021-12-12 RX ADMIN — LISDEXAMFETAMINE DIMESYLATE 40 MG: 10 CAPSULE ORAL at 08:19

## 2021-12-12 RX ADMIN — DULOXETINE 60 MG: 60 CAPSULE, DELAYED RELEASE ORAL at 08:19

## 2021-12-12 RX ADMIN — IBUPROFEN 400 MG: 200 TABLET, FILM COATED ORAL at 18:04

## 2021-12-12 ASSESSMENT — ACTIVITIES OF DAILY LIVING (ADL)
ORAL_HYGIENE: INDEPENDENT
DRESS: INDEPENDENT
HYGIENE/GROOMING: INDEPENDENT

## 2021-12-12 NOTE — PROGRESS NOTES
Pt was visible on milieu. Calm, pleasant /cooperative behavior. Mother visited, and they spent  time playing a board game.  Observed reading in room after mother left.Pt reports no psychiatric symptoms. Contracts for safety and is medication compliant

## 2021-12-12 NOTE — PROGRESS NOTES
12/12/21 1126   Engagement   Intervention Group   Topic Detail Scratch art for concentration, focus, creativity, fine motor skills, self-confidence, healthy distraction, and frustration tolerance.   Attendance Attended   Patient Response Demonstrated understanding of materials provided;Prosocial behavior;Improved mood in response to group   Concentrated on Task duration of group   Cognition Sequences task;Goal-directed   Mood/Affect Bright;Content;Pleasant   Social/Behavioral Cooperative;Engaged;Motivated   Thought Content Reality oriented   Goals addressed in session today Patient worked in goal directed manner and was pleasant towards therapists and peers. He expressed satisfaction in his work.

## 2021-12-12 NOTE — PLAN OF CARE
Problem: Sleep Disturbance  Goal: Adequate Sleep/Rest  Outcome: Improving       Pt slept for > 8 hours without any interruptions in sleep pattern or any distress.

## 2021-12-12 NOTE — PROGRESS NOTES
Pt ate 100% of lunch, remained in the lounge putting puzzles together, compliant with medications, staff will continue with plan of care.

## 2021-12-12 NOTE — PLAN OF CARE
Pt is pleasant and cooperative with care and staff. Medication compliant. Ate 100% of breakfast. Pt states having anxiety because his mother will be coming to visit. Anxiety between 6 and 7.  Pt states refused medication. States he should be able to mange anxiety without medication. Visible on the unit and participated to group.  Problem: Behavioral Health Plan of Care  Goal: Plan of Care Review  Outcome: Improving  Flowsheets (Taken 12/12/2021 5537)  Plan of Care Reviewed With: patient  Patient Agreement with Plan of Care: agrees     Problem: Suicidal Behavior  Goal: Suicidal Behavior is Absent or Managed  Outcome: Improving     Problem: Suicide Risk  Goal: Absence of Self-Harm  Outcome: Improving

## 2021-12-12 NOTE — PROGRESS NOTES
Pt was isolative in room, up for meals. Diastolic BP elevated sitting 134/92, and standing 127/97. Parameter for PRN Hydralazine not met. Denied SI/HI, Hallucinations/delusions. Rated anxiety 7/10, and depression 5/10. Pt stated other peers that are yelling and cursing is increasing his anxiety. Denied pain. PRN Atarax given for anxiety. Med compliant and contracted for safety. Will continue to evaluate.

## 2021-12-13 PROCEDURE — 128N000001 HC R&B CD/MH ADULT

## 2021-12-13 PROCEDURE — 99231 SBSQ HOSP IP/OBS SF/LOW 25: CPT | Performed by: NURSE PRACTITIONER

## 2021-12-13 PROCEDURE — 250N000013 HC RX MED GY IP 250 OP 250 PS 637: Performed by: NURSE PRACTITIONER

## 2021-12-13 RX ADMIN — DULOXETINE 60 MG: 60 CAPSULE, DELAYED RELEASE ORAL at 08:31

## 2021-12-13 RX ADMIN — GABAPENTIN 200 MG: 100 CAPSULE ORAL at 08:32

## 2021-12-13 RX ADMIN — LAMOTRIGINE 250 MG: 150 TABLET ORAL at 08:31

## 2021-12-13 RX ADMIN — TEMAZEPAM 30 MG: 15 CAPSULE ORAL at 21:57

## 2021-12-13 RX ADMIN — GABAPENTIN 200 MG: 100 CAPSULE ORAL at 21:58

## 2021-12-13 RX ADMIN — GUANFACINE 4 MG: 2 TABLET, EXTENDED RELEASE ORAL at 08:30

## 2021-12-13 RX ADMIN — RISPERIDONE 1.5 MG: 0.5 TABLET ORAL at 21:57

## 2021-12-13 RX ADMIN — LISDEXAMFETAMINE DIMESYLATE 40 MG: 10 CAPSULE ORAL at 08:31

## 2021-12-13 RX ADMIN — VILAZODONE HYDROCHLORIDE 20 MG: 20 TABLET ORAL at 08:31

## 2021-12-13 RX ADMIN — PRAZOSIN HYDROCHLORIDE 4 MG: 1 CAPSULE ORAL at 21:57

## 2021-12-13 RX ADMIN — GABAPENTIN 200 MG: 100 CAPSULE ORAL at 14:22

## 2021-12-13 ASSESSMENT — ACTIVITIES OF DAILY LIVING (ADL): HYGIENE/GROOMING: INDEPENDENT

## 2021-12-13 NOTE — PLAN OF CARE
Problem: Behavioral Health Plan of Care  Goal: Plan of Care Review  Outcome: Improving  Flowsheets (Taken 12/12/2021 0965 by Annette Frazier RN)  Plan of Care Reviewed With: patient  Patient Agreement with Plan of Care: agrees  Goal: Patient-Specific Goal (Individualization)  Outcome: Improving  Note:     Goal: Adheres to Safety Considerations for Self and Others  Outcome: Improving  Goal: Absence of New-Onset Illness or Injury  Outcome: Improving  Goal: Optimized Coping Skills in Response to Life Stressors  Outcome: Improving  Goal: Develops/Participates in Therapeutic [place] to Support Successful Transition  Outcome: Improving     Problem: Suicidal Behavior  Goal: Suicidal Behavior is Absent or Managed  Outcome: Improving     Problem: Sleep Disturbance  Goal: Adequate Sleep/Rest  Outcome: Improving     Pt up and out on unit, social with select peers, attending groups. He rated depression 4 to 5, due to being in the hospital. He is anxious to come up with good discharge plan and facility that will help him cope with his autism. He was med compliant, pleasant and cooperative. He denies pain at this time.

## 2021-12-13 NOTE — PROGRESS NOTES
"Pt was pleasant on approach, calm and cooperative with blunted affect. Pt was mostly isolative in his room, came out for meals and medications. Pt endorsed anxiety 4-6/10, depression 4-5/10, denied hallucinations, SI/HI and SIB. Pt reported pain in his lower back area, rated at 5-6/10. Prn Ibuprofen was given. Pt also reported \"twitching\" and soreness in his neck area, which comes and goes, and prn Lidocaine ointment was applied. Contracts for safety and is medication compliant.   "

## 2021-12-13 NOTE — PLAN OF CARE
"   12/13/21 [facility]   Occupational Therapy Psychosocial Assessment   Assessment Type Interview;Interview Form;Chart Review;Interdisciplinary Team Report   Prior Level of Function   People in home parent(s)   Current Living Arrangements other (see comments)  ([facility] home)   ADLs   Activity/Exercise/Self-Care Comment Pt is independent in ADLs/IADLs.    Instrumental Activities of Daily Living (IADL)   Previous Responsibilities meal prep;laundry;medication management;driving;work;finances;shopping;housekeeping   Functional Mobility   Functional Mobility Pt is independent.    Equipment Currently Used at Home none   Therapy Assessment   Patient Presentation Patient presents as pleasant, calm, and cooperative upon approach. Patient tolerates eye contact during interaction. Patient appeared to be distracted at times and would require therapist to repeat assessment questions. The patient has some insight into how his MH diagnosis impacts his daily life but reports that he does not properly manage it. Consistent with chart review, the patient reports recent MH symptoms worsen due to family dynamic change, being diagnosed with Autism, and other life stressors. Patient reports a lot of shame and guilt about worsening MH symptoms, failed suicide attempt two years ago, and \"being diagnosed with autism this late in life.\" Patient expressed frustration about not being able to handle his emotions appropriately. Patient shared that he is \"emotionally withdrawn, and it impacts my ability to develop healthy social connection with others.\" Patient reports that he has \"self-awareness about his deficits,\" which has helped him evaluate some of his \"strengths and weakness.\" Patient engaged in change talk, particularly when discussing his future plans to start therapy for his autism diagnosis, developing new coping strategies to deal with MH symptoms, and developing a satisfying routine to occupy his time.  Patient was recently admitted due to " "concerns related to suicidal ideation.    Patient-identified areas of success Work or volunteering;Taking care of the place of living;Transportation   Specifics of work or volunteer work Works at a small business as a .    Patient-identified areas of difficulty Difficulty concentrating;Motivation/mood;Sleeping too much or not enough;Lack of satisfying daily routine;Other (see comment)  (diagnosed with Autism in June 2021.)   Patient-identified sources of support Safe place to live;Family, friends or caregivers to help when needed;Pets;A best frient or significant other;Access to email, social media, phone calls;Leisure supplies to support own interests and hobbies;Professional support   Professional support details Pt has psychiatrist he sees regularly once a month for the past three month.    Patient-identified emotional, physical or mental health concerns \"I have difficult processing my more complex emotions especially in time of distress.\"   Patient-identified coping stategies for these concerns \" I distract myself, withdraw. I hope to engage in more mindful actvities in the future.\"   Patient-identified stressors or changes in the past year \"I dropped out of college, my dad confessed to being an alcoholic, my parents are getting , and being diagnosed with Autism   Patient-identified values \"Overall, I like to think I can be empathetic and kind but it has become less recently.\"   Patient's goal for the future \"Become an  and live independently.\"   Patient-identified community resources    Patient's goals to work on with OT Develop a satisfying daily routine;Share own thoughts and feelings;Explore use of sensory coping strategies   Clinical Impression   Patient Personal Strengths family/social support;insight into illness/situation;expressive of needs;community support;interests/hobbies;motivated for treatment;stable living environment;socioeconomic " stability;self-awareness;positive vocational history   Pt appears to have the following barriers/limitations Low confidence;Poorly managed mental health symptoms;Guilt/shame;Lack of daily routine;Limited knowledge;Limited insight into mental health symptoms   Limited knowledge details Pt has limited insight in healhty ways of managing MH symptoms.    Patient's barriers/limitations contribute to Exacerbation of mental health symptoms;Poor follow through with treatment recommendations;Limited pursuit of recovery strategies;Limited cooperation with care team;Limited active engagement in recovery strategies   Planned Interventions Encourage group attendance;Identify and develop coping strategies;Engage in activities for insight development;Continue to build rapport;Encourage engagement in meaningful activities   Beh OT Plan for Next Session Pt will engage in at least 5 OT group activity to support recovery of symptoms during this reporting period; Patient will identify 3 healthy coping skills to manage anxiety and reduce symptoms during this reporting.

## 2021-12-13 NOTE — PLAN OF CARE
Assessment/Intervention/Current Symptoms and Care Coordination  Writer met with the patient to check in and consulted with the provider during treatment team meeting. Patient presents with a bright mood, cooperative behavior, and goal oriented thought process. He denied any suicidal thoughts. He expressed continued interest in the Autism IOP with [facility]; writer left another voicemail for the director of the program [phone]) to find out how to make a referral. Writer called [facility] to schedule follow up psychiatry for patient for Jan 4th at 9:35am; information in AVS. Called and left voicemail for patient's therapist, Dr. Nathalie Callahan, to schedule follow up therapy.    Discharge Plan or Goal  Home with outpatient support    Barriers to Discharge   Symptom stabilization, med management, and care coordination    Referral Status  Call made to [facility] for Autism IOP referral  Appt with [facility] Scheduled    Legal Status  Voluntary    Tali Ziegler [facility], 12/13/2021, 10:19 AM

## 2021-12-13 NOTE — PLAN OF CARE
Problem: Behavioral Health Plan of Care  Goal: Patient-Specific Goal (Individualization)  Outcome: Improving  Flowsheets (Taken 12/9/2021 1052)  Patient Vulnerabilities: poor impulse control  Patient-Specific Goals (Include Timeframe): Pt wants to learn to regulate his emotions  Patient Personal Strengths:   family/social support   insight into illness/situation     BEHAVIORAL TEAM DISCUSSION    Participants: Provider: CATHERINE, RN: TRISTAN, : GOKUL, Pharmacy: [facility], OT: MIRANDA  Progress: Pt is progressing  Anticipated length of stay: Discharge this week  Continued Stay Criteria/Rationale: Symptom stabilization, med management, care coordination  Medical/Physical: Hospitalist consult due to blood pressure  Precautions:   Behavioral Orders   Procedures    Code 1 - Restrict to Unit    Routine Programming     As clinically indicated    Self Injury Precaution    Status 15     Every 15 minutes.    Suicide precautions     Patients on Suicide Precautions should have a Combination Diet ordered that includes a Diet selection(s) AND a Behavioral Tray selection for Safe Tray - with utensils, or Safe Tray - NO utensils       Plan: Home with outpatient supports

## 2021-12-13 NOTE — PROGRESS NOTES
"   12/13/21 6890   Engagement   Intervention Group   Topic Symptom management   Topic Detail creative expression: suncatchers for fine motor challenge, coping skill development   Attendance Attended   Patient Response Demonstrated understanding of materials provided;Accepted feedback;Was respectful   Concentrated on Task duration of group   Cognition Goal-directed;Takes initiative;Follows through with task   Mood/Affect Congruent;Content   Social/Behavioral Cooperative;Engaged   Thought Content Reality oriented   Goals addressed in session today Willing to engage in group, used supplies appropriately, respectful toward staff and peers. Shared that he enjoys doing Sudokus to help him relax. Accepted assistance for problem solving when he made a mistake on his project. Initially pt wanted to give up and abandon task, however pt accepted therapist assistance and pt was then able to finish task. Shared that he used to enjoy working out and running but has stopped \"because I've been so depressed\". Desires to resume working out once he leaves the hospital.     "

## 2021-12-13 NOTE — PROGRESS NOTES
12/13/21 1244   Engagement   Intervention Group   Topic Detail Theraband exerise for healthy routine building and physical and emotional wellness   Attendance Attended   Patient Response Demonstrated understanding of materials provided;Was respectful;Expressed feelings/issues;Positive attitude   Concentrated on Task duration of group   Cognition Goal-directed;Follows through with task   Mood/Affect Content;Pleasant   Social/Behavioral Cooperative;Engaged

## 2021-12-13 NOTE — PLAN OF CARE
Problem: Suicidal Behavior  Goal: Suicidal Behavior is Absent or Managed  Outcome: Improving     Problem: Suicide Risk  Goal: Absence of Self-Harm  Outcome: Improving

## 2021-12-13 NOTE — PLAN OF CARE
Problem: Sleep Disturbance  Goal: Adequate Sleep/Rest  Outcome: Improving   Patient slept through the night with no issue. Safety check completed every 15 minutes. No respiratory distress noted or observed. Patient is still in bed sleeping. Will continue to monitor pt.

## 2021-12-14 PROCEDURE — 99231 SBSQ HOSP IP/OBS SF/LOW 25: CPT | Performed by: NURSE PRACTITIONER

## 2021-12-14 PROCEDURE — 250N000013 HC RX MED GY IP 250 OP 250 PS 637: Performed by: NURSE PRACTITIONER

## 2021-12-14 PROCEDURE — 128N000001 HC R&B CD/MH ADULT

## 2021-12-14 RX ADMIN — GABAPENTIN 200 MG: 100 CAPSULE ORAL at 08:22

## 2021-12-14 RX ADMIN — LISDEXAMFETAMINE DIMESYLATE 40 MG: 10 CAPSULE ORAL at 08:21

## 2021-12-14 RX ADMIN — PRAZOSIN HYDROCHLORIDE 4 MG: 1 CAPSULE ORAL at 22:14

## 2021-12-14 RX ADMIN — GUANFACINE 4 MG: 2 TABLET, EXTENDED RELEASE ORAL at 08:21

## 2021-12-14 RX ADMIN — RISPERIDONE 1.5 MG: 0.5 TABLET ORAL at 22:14

## 2021-12-14 RX ADMIN — GABAPENTIN 200 MG: 100 CAPSULE ORAL at 20:10

## 2021-12-14 RX ADMIN — GABAPENTIN 200 MG: 100 CAPSULE ORAL at 14:37

## 2021-12-14 RX ADMIN — VILAZODONE HYDROCHLORIDE 20 MG: 20 TABLET ORAL at 08:21

## 2021-12-14 RX ADMIN — LAMOTRIGINE 250 MG: 150 TABLET ORAL at 08:21

## 2021-12-14 RX ADMIN — TEMAZEPAM 30 MG: 15 CAPSULE ORAL at 22:13

## 2021-12-14 RX ADMIN — DULOXETINE 60 MG: 60 CAPSULE, DELAYED RELEASE ORAL at 08:22

## 2021-12-14 ASSESSMENT — MIFFLIN-ST. JEOR: SCORE: 1869.9

## 2021-12-14 NOTE — PLAN OF CARE
Assessment/Intervention/Current Symptoms and Care Coordination  Writer met with the patient to check in and consulted with the MD. Patient presented with an anxious mood, but reported he is feeling good and denied any suicidal ideation or depression. He was anxious because he had been doing a puzzle with peers who were talking about their own symptoms of paranoia, and patient was worried that nursing staff may have seen him engaging in this conversation and assumed he also experienced paranoia. Since admission patient has denied all symptoms of psychosis and has not exhibited any overt signs of psychosis.  Received call from [facility], they are not currently running their Autism day program. Writer informed patient and agreed to refer to a different IOP(referral made to [facility]), but provide him with this information so he can follow up in the future if he chooses. Writer called to schedule an appt with his outpatient therapist. She expressed concern about patient's substance use. Writer explained that patient has not been open in sharing any concerns with substance use while here, however writer can provide patient with resources should he choose to pursue support in this area. When writer asked patient about this, he said the only substance he was using was Kratom and he is not interested in substance use treatment at this time. His therapist had also suggested family therapy for patient and his mother. Writer asked patient about this and he was open to it; writer to explore possible options after consulting with patient's mom. Writer submitted a referral for mental health case management in [facility] through [facility] in order to ensure patient has adequate supports once he returns home.     Discharge Plan or Goal  Home with outpatient support    Barriers to Discharge   Symptom stabilization, med management, and care coordination    Referral Status  Psychiatry  Appointment  Appointment Date/Time: January 4th at 9:35am  Psychiatrist: Soham Logan MD    Address: [address], [address]  [place] [address]    Phone Number: [phone]      Therapy Appointment  Appointment Date/Time: Thursday, December 16th at 2:00pm  Therapist: Dr. Nathalie Callahan  Address: [address], [address] [address]     Phone Number: [phone]     You have been referred to the Intensive Outpatient Program through [facility]. They will be reaching out to you at [phone] within 3-5 business days in order to schedule your intake assessment.     A referral for mental health case management has been made for you in [facility]. They will reach out to you directly. To follow up or with questions, call [phone].     A referral for [facility] has been made for you with [facility]. They will reach out to you directly. To follow up or with questions, call [phone].    Legal Status  Voluntary    Tali Osmanberg [facility], 12/14/2021, 10:19 AM

## 2021-12-14 NOTE — PROGRESS NOTES
Pt was distracted and required cueing from therapist during activity. Pt had poor concentration and attention. Pt appeared to be more interested in socializing with peers. Pt was redirectable and accepted feedback from therapist.      12/14/21 1346   Engagement   Intervention Group   Topic Detail Journaling for direction following, concentration, calming, self-awareness, insight development, coping with symptoms, wellness strategy, and socialization.   Attendance Attended   Patient Response Expressed feelings/issues;Was respectful;Positive attitude;Prosocial behavior   Concentrated on Task duration of group   Cognition Distractible;Attends to detail;Follows through with task   Mood/Affect Congruent   Social/Behavioral Engaged;Motivated   Supervision   Supervision Direct supervision provided

## 2021-12-14 NOTE — PLAN OF CARE
Problem: Behavioral Health Plan of Care  Goal: Plan of Care Review  Outcome: Improving  Goal: Patient-Specific Goal (Individualization)  Outcome: Improving  Note:     Goal: Adheres to Safety Considerations for Self and Others  Outcome: Improving  Goal: Absence of New-Onset Illness or Injury  Outcome: Improving  Goal: Optimized Coping Skills in Response to Life Stressors  Outcome: Improving  Goal: Develops/Participates in Therapeutic [place] to Support Successful Transition  Outcome: Improving     Problem: Suicidal Behavior  Goal: Suicidal Behavior is Absent or Managed  Outcome: Improving     Problem: Suicide Risk  Goal: Absence of Self-Harm  Outcome: Improving     Pt up and out on unit. He is calm, pleasant and cooperative. He is med compliant. He says his anxiety and depression are at his baseline and he rates them 5/10. He denies SI and all other psych symptoms. He is social with select peers and is working on puzzle this am and attended group.

## 2021-12-14 NOTE — PROGRESS NOTES
12/14/21 1309   Engagement   Intervention Group   Topic Detail Decorating journals as follow up to journaling as a coping skill group this morning with review of education for creative expression, coping with symptoms, and relaxation training   Attendance Attended   Patient Response Demonstrated understanding of materials provided;Was respectful;Positive attitude;Expressed feelings/issues;Contributes to conversation   Concentrated on Task duration of group   Cognition Goal-directed   Mood/Affect Pleasant   Social/Behavioral Cooperative;Engaged   Goals addressed in session today Pt initially stated he was not artistic, however paid close attention to detail in his design and appeared pleased with the outcome.  He accepted encouragement from staff and peers and was pleasant and engaged all throughout group.

## 2021-12-14 NOTE — PROGRESS NOTES
"PSYCHIATRY  PROGRESS NOTE     DATE OF SERVICE   12/14/2021         CHIEF COMPLAINT   \"feeling great\"       SUBJECTIVE   Nursing reports: Patient was visible in the milieu social attending groups and social with select peers. Patient was medication compliant. Rated anxiety and depression as  4 denies other symptoms     reports:  I have reviewed patient with the SW earlier today and the plan remains for the patient to discharge home tomorrow with outpatient supports. referred to the Intensive Outpatient Program through [facility] [place]. They will be reaching out to you at [phone] within 3-5 business days in order to schedule your intake assessment.  A referral for mental health case management has been made for patient in [facility]. They will reach out to you directly. To follow up or with questions, call [phone].  A referral for [facility] has been made for patient with [facility]. They will reach out to you directly. To follow up or with questions, call [phone].        OBJECTIVE   Chart reviewed and patient assessed. Patient was seen and evaluated in the comfort room by himself. Upon patient interview, patient reports he is feeling great, saying he is ready for discharge. Patient stated he feels as though he has accomplished the goal for this hospitalization, saying his emotion is better regulated than before. Patient stated he is particularly excited about the supports he is going to be having post hospitalization. Apart from the baseline anxiety and depression, he denies all psych symptoms.      MEDICATIONS   Medications:  Scheduled Meds:    [START ON 12/15/2021] DULoxetine  90 mg Oral Daily     gabapentin  200 mg Oral TID     guanFACINE  4 mg Oral QAM     lamoTRIgine  250 mg Oral QAM     lisdexamfetamine  40 mg Oral QAM     prazosin  4 mg Oral At Bedtime     risperiDONE  1.5 mg Oral At Bedtime     temazepam  30 mg Oral At Bedtime     Continuous Infusions:  PRN Meds:.alum & mag " "hydroxide-simethicone, hydrALAZINE, hydrOXYzine, ibuprofen, lidocaine, OLANZapine **OR** OLANZapine, traZODone    Medication adherence issues: MS Med Adherence Y/N: No  Medication side effects: MEDICATION SIDE EFFECTS: no side effects reported  Benefit: Yes / No: Yes       ROS   A comprehensive review of systems was negative. Except noted in HPI       MENTAL STATUS EXAM   Vitals: /77 (BP Location: Right arm, Patient Position: Sitting)   Pulse 73   Temp 98.7  F (37.1  C) (Oral)   Resp 16   Ht 1.854 m (6' 1\")   Wt 81.6 kg (179 lb 14.4 oz)   SpO2 99%   BMI 23.73 kg/m      Appearance:  Casually groomed  Mood:  \"better\"  Affect: full range  was congruent to speech, intensity is normal and reactive  Suicidal Ideation: PRESENT / ABSENT: absent   Homicidal Ideation: PRESENT / ABSENT: absent   Thought process: concrete   Thought content: denies suicidal ideation, violent ideation, delusions, magical thinking and paranoid ideation.   Fund of Knowledge: Average  Attention/Concentration: Fair  Language ability:  Intact  Memory:  Immediate recall intact, Short-term memory intact and Long-term memory intact  Insight:  fair.  Judgement: fair  Orientation: Yes, x4  Psychomotor Behavior: denies tardive dyskinesia, dystonia or tics  Muscle Strength and Tone: MuscleStrength: Normal  Gait and Station: Normal       LABS   personally reviewed.     No results found for: PHENYTOIN, PHENOBARB, VALPROATE, CBMZ       DIAGNOSIS   Principal Problem:    Major depressive disorder, recurrent episode, moderate (H)    Active Problem List:  Patient Active Problem List   Diagnosis     Depression with suicidal ideation     Major depressive disorder, recurrent episode, moderate (H)     ADHD (attention deficit hyperactivity disorder)     Active autistic disorder          PLAN   1. Ongoing education given regarding diagnostic and treatment options with risks, benefits and alternatives and adequate verbalization of " understanding.  2.Medications: Duloxetine 60 mg daily                          Viibryd 20 mg daily                          Vyvance 40 mg daily                          Gabapentin 200 mg TID                          Guanfacine 4 mg daily                          Lamictal 250 mg daily                          Risperidone 1.5 mg at bedtime                          Prazosin 4 mg at bedtime                          Temazepam 30 mg at bedtime  3. Hospitalist consult: Hospitalist to see patient as needed. Hospitalist is aware of Elevated BP  4. Legal: Voluntary  5.  to follow regarding collecting and reviewing collateral information, referrals and disposition planning.     Risk Assessment: [facility] RISK ASSESSMENT: Patient able to contract for safety and Patient on precautions    Coordination of Care:   Treatment Plan reviewed and physician signed, Care discussed with Care/Treatment Team Members, Chart reviewed and Patient seen      Re-Certification I certify that the inpatient psychiatric facility services furnished since the previous certification were, and continue to be, medically necessary for, either, treatment which could reasonably be expected to improve the patient s condition or diagnostic study and that the hospital records indicate that the services furnished were, either, intensive treatment services, admission and related services necessary for diagnostic study, or equivalent services.     I certify that the patient continues to need, on a daily basis, active treatment furnished directly by or requiring the supervision of inpatient psychiatric facility personnel.   I estimate 3-5 days of hospitalization is necessary for proper treatment of the patient. My plans for post-hospital care for this patient are  home with family     SOCO Sanchez CNP    -     12/14/2021     -     3:20 PM    Total time  15 minutes with > 50%spent on coordination of cares and psycho-education.    This note was  created with help of Dragon dictation system. Grammatical / typing errors are not intentional.    SOCO Sanchez CNP

## 2021-12-14 NOTE — PROGRESS NOTES
"PSYCHIATRY  PROGRESS NOTE     DATE OF SERVICE   12/13/2021         CHIEF COMPLAINT   \"getting there\"       SUBJECTIVE   Nursing reports: Patient was visible in the milieu social attending groups and social with select peers. Patient was medication compliant. Rated anxiety and depression as  4 denies other symptoms     reports:  I have reviewed patient with the SW during the IDT meeting and the plan remains for the patient to discharge home by the middle of the week. Psychiatric appointment made for 1/4/22       OBJECTIVE   Chart reviewed and patient assessed. Patient was seen and evaluated in the comfort room by himself. Upon patient interview, patient reports he has chosen the kind of autism based IOP treatment he would like attend after discharge, saying the SW is helping him with the referral, Patient reported great improvement in symptoms since admission. Patient presented as pleasant, appropriate and cooperative during this assessment. He stated his anxiety and depression are at baseline. Patient denies SI/SIB/hi as well as AVHs. No psychosis noted during this assessment.         MEDICATIONS   Medications:  Scheduled Meds:    DULoxetine  60 mg Oral Daily    Followed by     [START ON 12/15/2021] DULoxetine  90 mg Oral Daily     gabapentin  200 mg Oral TID     guanFACINE  4 mg Oral QAM     lamoTRIgine  250 mg Oral QAM     lisdexamfetamine  40 mg Oral QAM     prazosin  4 mg Oral At Bedtime     risperiDONE  1.5 mg Oral At Bedtime     temazepam  30 mg Oral At Bedtime     vilazodone  20 mg Oral Daily     Continuous Infusions:  PRN Meds:.alum & mag hydroxide-simethicone, hydrALAZINE, hydrOXYzine, ibuprofen, lidocaine, OLANZapine **OR** OLANZapine, traZODone    Medication adherence issues: MS Med Adherence Y/N: No  Medication side effects: MEDICATION SIDE EFFECTS: no side effects reported  Benefit: Yes / No: Yes       ROS   A comprehensive review of systems was negative. Except noted in HPI       MENTAL " "STATUS EXAM   Vitals: BP (!) 135/95 (BP Location: Left arm, Patient Position: Sitting)   Pulse 105   Temp 98.4  F (36.9  C) (Oral)   Resp 18   Ht 1.854 m (6' 1\")   Wt 83 kg (183 lb)   SpO2 99%   BMI 24.14 kg/m      Appearance:  Casually groomed  Mood:  \"better\"  Affect: full range  was congruent to speech, intensity is normal and reactive  Suicidal Ideation: PRESENT / ABSENT: absent   Homicidal Ideation: PRESENT / ABSENT: absent   Thought process: concrete   Thought content: denies suicidal ideation, violent ideation, delusions, magical thinking and paranoid ideation.   Fund of Knowledge: Average  Attention/Concentration: Fair  Language ability:  Intact  Memory:  Immediate recall intact, Short-term memory intact and Long-term memory intact  Insight:  fair.  Judgement: fair  Orientation: Yes, x4  Psychomotor Behavior: denies tardive dyskinesia, dystonia or tics  Muscle Strength and Tone: MuscleStrength: Normal  Gait and Station: Normal       LABS   personally reviewed.     No results found for: PHENYTOIN, PHENOBARB, VALPROATE, CBMZ       DIAGNOSIS   Principal Problem:    Major depressive disorder, recurrent episode, moderate (H)    Active Problem List:  Patient Active Problem List   Diagnosis     Depression with suicidal ideation     Major depressive disorder, recurrent episode, moderate (H)     ADHD (attention deficit hyperactivity disorder)     Active autistic disorder          PLAN   1. Ongoing education given regarding diagnostic and treatment options with risks, benefits and alternatives and adequate verbalization of understanding.  2.Medications: Duloxetine 60 mg daily                          Viibryd 20 mg daily                          Vyvance 40 mg daily                          Gabapentin 200 mg TID                          Guanfacine 4 mg daily                          Lamictal 250 mg daily                          Risperidone 1.5 mg at bedtime                          Prazosin 4 mg at bedtime       "                    Temazepam 30 mg at bedtime  3. Hospitalist consult: Hospitalist to see patient as needed. Hospitalist is aware of Elevated BP  4. Legal: Voluntary  5.  to follow regarding collecting and reviewing collateral information, referrals and disposition planning.     Risk Assessment: [facility] RISK ASSESSMENT: Patient able to contract for safety and Patient on precautions    Coordination of Care:   Treatment Plan reviewed and physician signed, Care discussed with Care/Treatment Team Members, Chart reviewed and Patient seen      Re-Certification I certify that the inpatient psychiatric facility services furnished since the previous certification were, and continue to be, medically necessary for, either, treatment which could reasonably be expected to improve the patient s condition or diagnostic study and that the hospital records indicate that the services furnished were, either, intensive treatment services, admission and related services necessary for diagnostic study, or equivalent services.     I certify that the patient continues to need, on a daily basis, active treatment furnished directly by or requiring the supervision of inpatient psychiatric facility personnel.   I estimate 3-5 days of hospitalization is necessary for proper treatment of the patient. My plans for post-hospital care for this patient are  home with family     SOCO Sanchez CNP    -     12/10/2021     -     1:40 PM    Total time  15 minutes with > 50%spent on coordination of cares and psycho-education.    This note was created with help of Dragon dictation system. Grammatical / typing errors are not intentional.    SOCO Sanchez CNP

## 2021-12-15 VITALS
DIASTOLIC BLOOD PRESSURE: 77 MMHG | SYSTOLIC BLOOD PRESSURE: 122 MMHG | HEART RATE: 73 BPM | WEIGHT: 179.9 LBS | OXYGEN SATURATION: 97 % | RESPIRATION RATE: 16 BRPM | HEIGHT: 73 IN | TEMPERATURE: 98.3 F | BODY MASS INDEX: 23.84 KG/M2

## 2021-12-15 PROCEDURE — 99239 HOSP IP/OBS DSCHRG MGMT >30: CPT | Performed by: NURSE PRACTITIONER

## 2021-12-15 PROCEDURE — 250N000013 HC RX MED GY IP 250 OP 250 PS 637: Performed by: NURSE PRACTITIONER

## 2021-12-15 RX ORDER — LISDEXAMFETAMINE DIMESYLATE 40 MG/1
40 CAPSULE ORAL EVERY MORNING
Qty: 30 CAPSULE | Refills: 0 | Status: SHIPPED | OUTPATIENT
Start: 2021-12-15 | End: 2021-12-15

## 2021-12-15 RX ORDER — LAMOTRIGINE 100 MG/1
250 TABLET ORAL EVERY MORNING
Qty: 75 TABLET | Refills: 0 | Status: SHIPPED | OUTPATIENT
Start: 2021-12-15

## 2021-12-15 RX ORDER — LISDEXAMFETAMINE DIMESYLATE 40 MG/1
40 CAPSULE ORAL EVERY MORNING
Qty: 30 CAPSULE | Refills: 0 | Status: SHIPPED | OUTPATIENT
Start: 2021-12-15

## 2021-12-15 RX ORDER — DULOXETIN HYDROCHLORIDE 30 MG/1
90 CAPSULE, DELAYED RELEASE ORAL DAILY
Qty: 90 CAPSULE | Refills: 0 | Status: SHIPPED | OUTPATIENT
Start: 2021-12-16

## 2021-12-15 RX ORDER — GUANFACINE 4 MG/1
4 TABLET, EXTENDED RELEASE ORAL EVERY MORNING
Qty: 30 TABLET | Refills: 0 | Status: SHIPPED | OUTPATIENT
Start: 2021-12-16

## 2021-12-15 RX ORDER — PRAZOSIN HYDROCHLORIDE 2 MG/1
4 CAPSULE ORAL AT BEDTIME
Qty: 60 CAPSULE | Refills: 0 | Status: SHIPPED | OUTPATIENT
Start: 2021-12-15

## 2021-12-15 RX ORDER — RISPERIDONE 0.5 MG/1
1.5 TABLET ORAL AT BEDTIME
Qty: 90 TABLET | Refills: 0 | Status: SHIPPED | OUTPATIENT
Start: 2021-12-15

## 2021-12-15 RX ORDER — GABAPENTIN 100 MG/1
200 CAPSULE ORAL 3 TIMES DAILY
Qty: 180 CAPSULE | Refills: 0 | Status: SHIPPED | OUTPATIENT
Start: 2021-12-15

## 2021-12-15 RX ORDER — TEMAZEPAM 30 MG
30 CAPSULE ORAL AT BEDTIME
Qty: 30 CAPSULE | Refills: 0 | Status: SHIPPED | OUTPATIENT
Start: 2021-12-15

## 2021-12-15 RX ADMIN — LAMOTRIGINE 250 MG: 150 TABLET ORAL at 08:26

## 2021-12-15 RX ADMIN — LISDEXAMFETAMINE DIMESYLATE 40 MG: 10 CAPSULE ORAL at 08:27

## 2021-12-15 RX ADMIN — GABAPENTIN 200 MG: 100 CAPSULE ORAL at 12:51

## 2021-12-15 RX ADMIN — GABAPENTIN 200 MG: 100 CAPSULE ORAL at 08:26

## 2021-12-15 RX ADMIN — DULOXETINE 90 MG: 30 CAPSULE, DELAYED RELEASE ORAL at 08:26

## 2021-12-15 RX ADMIN — GUANFACINE 4 MG: 2 TABLET, EXTENDED RELEASE ORAL at 08:27

## 2021-12-15 NOTE — PLAN OF CARE
Problem: Behavioral Health Plan of Care  Goal: Plan of Care Review  Outcome: Adequate for Discharge  Goal: Adheres to Safety Considerations for Self and Others  Outcome: Adequate for Discharge  Goal: Absence of New-Onset Illness or Injury  Outcome: Adequate for Discharge  Goal: Optimized Coping Skills in Response to Life Stressors  Outcome: Adequate for Discharge  Goal: Develops/Participates in Therapeutic [place] to Support Successful Transition  Outcome: Adequate for Discharge     Problem: Suicidal Behavior  Goal: Suicidal Behavior is Absent or Managed  Outcome: Adequate for Discharge     Problem: Suicide Risk  Goal: Absence of Self-Harm  Outcome: Adequate for Discharge     Problem: Sleep Disturbance  Goal: Adequate Sleep/Rest  Outcome: Adequate for Discharge   Pt denied psych symptoms.  Out in the milieu and attended groups.  In a good mood, looking forward and ready for discharge.  Medications complaint.  Aleisha Hallman RN

## 2021-12-15 NOTE — PLAN OF CARE
Problem: Behavioral Health Plan of Care  Goal: Plan of Care Review  Outcome: Improving  Goal: Adheres to Safety Considerations for Self and Others  Intervention: Develop and Maintain Individualized Safety Plan  Recent Flowsheet Documentation  Taken 12/15/2021 0642 by Lucy Lancaster RN  Safety Measures: safety rounds completed  Goal: Absence of New-Onset Illness or Injury  Intervention: Identify and Manage Fall Risk  Recent Flowsheet Documentation  Taken 12/15/2021 0642 by Lucy Lancaster RN  Safety Measures: safety rounds completed     Problem: Suicidal Behavior  Goal: Suicidal Behavior is Absent or Managed  Outcome: Improving     Problem: Suicide Risk  Goal: Absence of Self-Harm  Outcome: Improving     Problem: Sleep Disturbance  Goal: Adequate Sleep/Rest  Outcome: Improving  Pt appeared sleeping for about   8 hours this shift.     No distress noted or reported this shift     Pt continue on 15 minutes checks   for safety    No pain or discomfort noted or reported    No SI/HI/SIB noted or reported this shift    No  inappropriate behaviors  noted or reported this shift     Pt is on suicide and SIB precautions. No relative issues  noted or reported this shift.      Will continue to monitor and assist as needed.

## 2021-12-15 NOTE — PROGRESS NOTES
"   12/15/21 0915   Engagement   Intervention Group   Topic Detail OT: Education regarding cognitive wellness and interactive social activity (Tenzie) to increase concentration, short term memory, memory recall, focus, attention to task/detail, following directions, engagement in healthy distractions, healthy leisure engagement, coping with stress, social engagement, and cognitive wellness   Attendance Attended   Patient Response Demonstrated understanding of materials provided;Was respectful;Contributes to conversation;Prosocial behavior   Concentrated on Task duration of group   Cognition Goal-directed;Follows through with task   Mood/Affect Content   Social/Behavioral Cooperative;Engaged   Thought Content Reality oriented     Pt sat among peers to complete activity and engaged in brief social interactions with peers. Pt able to follow verbal directions and visual demonstration for activity. At times pt downplayed the difficulty of the task, but then struggled to follow all directions. Pt was able to independently recognize the need to check his dice again to ensure he had the correct numbers. Pt shared that doing \"Sodoku\" is a way to increase his cognitive wellness. Pt progressing towards goal.   "

## 2021-12-15 NOTE — PROGRESS NOTES
Pt was discharged home from [address] at 1308 with all his belongings and medications. Was escorted by staff to be picked up by his mother.  Aleisha Hallman RN

## 2021-12-15 NOTE — PLAN OF CARE
"Discharge plan or goal: Home with outpatient supports     Today's Plan: Wilson will be discharging home with his mom today, 12/15 at 1:00pm. His mom is picking him up, and he should be discharged with 30 days of medications. Writer spoke with patient's mother this morning and asked about her interest or willingness to engage in family therapy, which is something that Wilson's outpatient therapist, Nathalie, had recommended the family try. Wilson was open to this; however, his mother said she would prefer to \"put a pin in this\" until things become more stable and steady at home. Writer to include resources for family therapy options in the AVS so patient and his family can pursue this independently if they choose to. Writer to include resources for autism community supports in the AVS as well. Writer met with patient who denied psych symptoms and stated he believes he's doing \"better than ever\". He is aware of all the outpatient appointments and supports/referrals that have been made and is motivated about following up.    Pt has the following outpatient appointment(s) scheduled:    Psychiatry Appointment  Appointment Date/Time: January 4th at 9:35am  Psychiatrist: Soham Logan MD    Address: [address] [place] [address] [address]  [place] [address]    Phone Number: [phone]     Therapy Appointment  Appointment Date/Time: Thursday, December 16th at 2:00pm  Therapist: Dr. Nathalie Callahan  Address: [address] [place] [address] [address] [address]     Phone Number: [phone]    Pt has the following professional community support(s):    You have been referred to the Intensive Outpatient Program through [facility]. They will be reaching out to you at [phone] within 3-5 business days in order to schedule your intake assessment.  IOP is an ideal level of mental health treatment for adults struggling with symptoms of mental illness that are interfering with their daily living, and are not manageable in " weekly therapy or medication management.  Patients participate in group programming, while working on an individualized treatment plan.  Treatment is supported by a multi-disciplinary team including Psychiatrists, Therapists, and Social Workers.    A referral for mental health case management has been made for you in [facility]. They will reach out to you directly. To follow up or with questions, call [phone].  The [facility] assists adults with mental illness in the following ways:    Provides information and referral, screening, case management, and other support services.    Connects individuals to resources in the community.    Contracts for Crisis Services and ACT (Assertive Community Treatment) Services.    A referral for [facility] has been made for you with [facility]. They will reach out to you directly. To follow up or with questions, call [phone].  [facility], or [facility], is a program to help individuals, who have a mental illness, function independently in their homes or places of residence.  At its core, [facility] is person-centered, skills-based and goal-oriented. This all means that [facility] can help a client work towards and achieve their goals, while focusing on a mental health perspective.    Legal Status: Voluntary    Diagnosis/Procedure:   Patient Active Problem List   Diagnosis     Depression with suicidal ideation     Major depressive disorder, recurrent episode, moderate (H)     ADHD (attention deficit hyperactivity disorder)     Active autistic disorder       Care Rounds Attendance:   CHELY   RN   Charge RN   OT/TR  MD/CNP    Tali Ziegler [facility], 12/15/2021, 8:20 AM

## 2021-12-15 NOTE — DISCHARGE SUMMARY
PSYCHIATRY  DISCHARGE SUMMARY     DATE OF DISCHARGE   12/15/2021           DISCHARGE DIAGNOSIS   Major depressive disorder, recurrent episode, moderate (H)    Patient Active Problem List   Diagnosis     Depression with suicidal ideation     Major depressive disorder, recurrent episode, moderate (H)     ADHD (attention deficit hyperactivity disorder)     Active autistic disorder          REASON FOR ADMISSION   This is part of Lexx Grijalva CNP H&P     This is a 21 year old male with history significant for major depressive disorder, WARREN, ADHD and Autism spectrum presented to the ED with his mother following concerns related to suicidal ideation. Current legal status is Voluntary. 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 Care coordination performed in details by obtaining collateral information from [facility] and [facility]. In brief, was having difficulties managing his emotions that emanated from psychosocial stressors and worsening depression and met with his therapist who advised he should go the the ER for further psychiatric evaluation and inpatient admission. Patient reportedly slammed his head against the counter-top while aggressively pulled a drawer and injured his arm prior visit with his therapist.      Patient was seen and evaluated in the consult room by himself. Patient presented as anxious, mildly agitated, thought blocking with distractibility. Patient appeared visibly anxious and restless as evidenced by constantly pulling his hair at intervals throughout the interview process. Patient complained of worsening depression in the context of psychosocial stressors related to family dynamics, personal development and life stressors. Patient  stated he was devastated after being diagnosed recently with Autism and ADHD while adjusting to the new reality of his parents divorce. Patient stated most times he feels as though he is not making any progress in life. Patient stated all he does mostly is to engage in media consumption. Patient reports he had to slam his head on the countertop to express his emotions as he does not know how to handle the frustration anymore. Patient stated the injury he sustained while pulling the drawer was not as a result of intentional self-harm. Patient currently denies suicidal and homicidal thoughts. He contracted for safety on the unit. Patient stated he does not want to die, adding all he wanted is to be able to sit with a professional for one hour each day and have a solid plan about how to best manage his frustrations. Patient stated he does not do well in a group setting. Patient did report history of suicidal attempt that led to inpatient mental admission in November 2020, when he attempted to jump off [place] at the [facility]. No overt psychosis noted during this assessment. UDS was positive for Methamphetamines, Benzo, and THC. Patient states he uses Delta 8 infrequently.      Patient reports past medication trials to include but not limited to Abilify, Zyprexa, Seroquel, Remeron, Zoloft, Celexa, Prozac, Trintellix, Effexor, Duloxetine, Viibryd. I plan to care coordinate with the patient current OP Psychiatrist to obtain full medication history. Patient is currently taking Duloxetine 60 mg daily with the goal to titrate to 90 mg in about a week, Guanfacine 4 mg daily, Lamictal 250 daily, Vyvance 40 mg daily, Risperidone 1 mg at bedtime, Temazepam 30 mg at bedtime, Prazosin 4 mg at bedtime and Viibryd 20 mg daily with the goal to discontinue in about a week. Risperidone increased to 1.5 mg at bedtime to target any residual psychosis.   Hospitalist consult placed for elevated BP       HOSPITAL COURSE    Admitted due to aforementioned presentation.  Education regarding diagnostic and treatment options with risks, benefits and alternatives and adequate verbalization of understanding.  Discussed reviewed in further detail, stressors and events leading to presentation.    The multidisciplinary treatment team met (RN, OT, , Physician) on a daily basis to discuss patient care and treatment planning. The psychiatric inpatient setting provided close nursing supervision and access to multiple treatment modalities and programming (group therapy, OT, one-to-one therapy.) Patient support systems such as family, , and other care providers were contacted as appropriate for collateral information and treatment planning.   Patient was maintained on his his PTA medication as we continued with the Duloxetine titration in the hospital while tapering off Viibryd as started by his outpatient psychiatrist. Patient's risperidone increased to 1.5 mg targeting irritability, cognition, impulsivity and impaired perception which he tolerated well. Patient reported improvement in symptoms since admission as evidenced by patient verbalizing denial of suicidal ideation and self injurious behaviors. Patient never exhibited SIB throughout this hospital stays. He reported improvement in frustration and anger management as he reported he is able to express emotions without having to engage in SIB. Patient denied suicidal ideation throughout the hospital stay. Patient was pleasant, calm and behaviorally appropriate throughout this hospital stay. He attended and participated in all the unit programming.   Throughout this hospitalization, patient exhibited improvement in mood, thinking and perception. Suicidal ideations improved and patient denies any SI or SIB urges. Sleep improved and remained stable. Appetite at baseline  At the time of discharge, there appeared to be no evidence that the patient posed an imminent threat  "to self or others and a reasonable discharge plan was in place, we determined that the patient had achieved optimal medical benefit from hospitalization and was discharged home with outpatient support as contained in the 's discharge summery. Of note, patient did not go home with Temazepam as pharmacy stated the medication is too early to fill. Patient was offered Trazodone or melatonin as an alternative to aid sleep which he refused, saying he can only sleep with the help of Benzodiazepines. Patient was comfortable prescribing additional benzodiazepine, suspecting drug misused as he advised patient to follow up with his outpatient provider to help prescribe Benzodiazepine for sleep.    ......................................................... 's discharge summary..................................................   Discharge plan or goal: Home with outpatient supports      Today's Plan: Wilson will be discharging home with his mom today, 12/15 at 1:00pm. His mom is picking him up, and he should be discharged with 30 days of medications. Writer spoke with patient's mother this morning and asked about her interest or willingness to engage in family therapy, which is something that Wilson's outpatient therapist, Nathalie, had recommended the family try. Wilson was open to this; however, his mother said she would prefer to \"put a pin in this\" until things become more stable and steady at home. Writer to include resources for family therapy options in the AVS so patient and his family can pursue this independently if they choose to. Writer to include resources for autism community supports in the AVS as well. Writer met with patient who denied psych symptoms and stated he believes he's doing \"better than ever\". He is aware of all the outpatient appointments and supports/referrals that have been made and is motivated about following up.     Pt has the following outpatient appointment(s) " scheduled:     Psychiatry Appointment  Appointment Date/Time: January 4th at 9:35am  Psychiatrist: Soham Logan MD    Address: [address]  [place] [address]    Phone Number: [phone]      Therapy Appointment  Appointment Date/Time: Thursday, December 16th at 2:00pm  Therapist: Dr. Nathalie Callahan  Address: [address], [address], [place], [place] [address]     Phone Number: [phone]     Pt has the following professional community support(s):     You have been referred to the Intensive Outpatient Program through [facility]. They will be reaching out to you at [phone] within 3-5 business days in order to schedule your intake assessment.  IOP is an ideal level of mental health treatment for adults struggling with symptoms of mental illness that are interfering with their daily living, and are not manageable in weekly therapy or medication management.  Patients participate in group programming, while working on an individualized treatment plan.  Treatment is supported by a multi-disciplinary team including Psychiatrists, Therapists, and Social Workers.     A referral for mental health case management has been made for you in [facility]. They will reach out to you directly. To follow up or with questions, call [phone].  The [facility] Adult Mental Health Unit assists adults with mental illness in the following ways:    Provides information and referral, screening, case management, and other support services.    Connects individuals to resources in the community.    Contracts for Crisis Services and ACT (Assertive Community Treatment) Services.     A referral for ARM has been made for you with [facility]. They will reach out to you directly. To follow up or with questions, call [phone].  Adult Rehabilitative Mental Health Services, or ARMHS, is a program to help individuals, who have a mental illness, function independently in their homes or  "places of residence.  At its core, [facility] is person-centered, skills-based and goal-oriented. This all means that [facility] can help a client work towards and achieve their goals, while focusing on a mental health perspective.     Legal Status: Voluntary     Diagnosis/Procedure:       Patient Active Problem List   Diagnosis     Depression with suicidal ideation     Major depressive disorder, recurrent episode, moderate (H)     ADHD (attention deficit hyperactivity disorder)     Active autistic disorder            MENTAL STATUS EXAM   Vitals: /77 (BP Location: Right arm, Patient Position: Sitting)   Pulse 73   Temp 98.3  F (36.8  C) (Oral)   Resp 16   Ht 1.854 m (6' 1\")   Wt 81.6 kg (179 lb 14.4 oz)   SpO2 97%   BMI 23.73 kg/m      Mental Status:  Appearance:  Neatly groomed  Mood:  \"excited about leaving the hospital\"  Affect: full range was was congruent to speech, intensity is normal and reactive  Suicidal Ideation: PRESENT / ABSENT: absent   Homicidal Ideation: PRESENT / ABSENT: absent   Thought process: logical and organized   Thought content: denies suicidal ideation, violent ideation, delusions, magical thinking and paranoid ideation.   Fund of Knowledge: Average  Attention/Concentration: Normal  Language ability:  Intact  Memory:  Immediate recall intact, Short-term memory intact and Long-term memory intact  Insight:  fair.  Judgement: fair  Orientation: Yes, x4  Psychomotor Behavior: denies tardive dyskinesia, dystonia or tics  Muscle Strength and Tone: MuscleStrength: Normal  Gait and Station: Normal         DISCHARGE MEDICATIONS   Discharge Medication Options:   Discharge Medication List as of 12/15/2021 11:07 AM      START taking these medications    Details   gabapentin (NEURONTIN) 100 MG capsule Take 2 capsules (200 mg) by mouth 3 times daily, Disp-180 capsule, R-0, E-Prescribe         CONTINUE these medications which have CHANGED    Details   DULoxetine (CYMBALTA) 30 MG capsule Take 3 capsules " (90 mg) by mouth daily, Disp-90 capsule, R-0, E-Prescribe      guanFACINE (INTUNIV) 4 MG TB24 Take 1 tablet (4 mg) by mouth every morning, Disp-30 tablet, R-0, E-Prescribe      lamoTRIgine (LAMICTAL) 100 MG tablet Take 2.5 tablets (250 mg) by mouth every morning, Disp-75 tablet, R-0, E-Prescribe      lisdexamfetamine (VYVANSE) 40 MG capsule Take 1 capsule (40 mg) by mouth every morning, Disp-30 capsule, R-0, Local Print      prazosin (MINIPRESS) 2 MG capsule Take 2 capsules (4 mg) by mouth At Bedtime, Disp-60 capsule, R-0, E-Prescribe      risperiDONE (RISPERDAL) 0.5 MG tablet Take 3 tablets (1.5 mg) by mouth At Bedtime, Disp-90 tablet, R-0, E-Prescribe      temazepam (RESTORIL) 30 MG capsule Take 1 capsule (30 mg) by mouth At Bedtime, Disp-30 capsule, R-0, E-Prescribe         CONTINUE these medications which have NOT CHANGED    Details   ibuprofen (ADVIL/MOTRIN) 200 MG tablet Take 200-400 mg by mouth every 4 hours as needed for mild pain, Historical         STOP taking these medications       vilazodone (VIIBRYD) 20 MG TABS tablet Comments:   Reason for Stopping:               Medication adherence issues: MS Med Adherence Y/N: No  Medication side effects: MEDICATION SIDE EFFECTS: no side effects reported         DISCHARGE PLAN   1.  Education given regarding diagnostic and treatment options with risks, benefits and alternatives with adequate verbalization of understanding.  2.  Discharge to Home with outpatient support.  Upon detailed review of risk factors, patient amenable for release.   3.  Continue aforementioned medications and associated medication changes with follow-up by outpatient mental health provider.  4.  Crisis management planning in place.    5.  Continue efforts for sobriety.    Nursing and  to review further discharge recommendations.     TOTAL TIME:  Greater than 30 minutes for discharge planning.    This note was created with help of Dragon dictation system. Grammatical / typing  errors are not intentional.    SOCO Sanchez CNP

## 2021-12-15 NOTE — DISCHARGE INSTRUCTIONS
Behavioral Discharge Planning and Instructions    Summary: You were admitted on 12/8/2021 due to Suicidal Ideations.  You were treated by Fei Grijalva and discharged on 12/15/2021 from [facility] to Home    Main Diagnosis: Major Depressive Disorder    Health Care Follow-up:     Psychiatry Appointment  Appointment Date/Time: January 4th at 9:35am  Psychiatrist: Soham Logan MD    Address: [address] [facility], [address]  [place] [address]    Phone Number: [phone]     Therapy Appointment  Appointment Date/Time: Thursday, December 16th at 2:00pm  Therapist: Dr. Nathalie Callahan  Address: [address], [place] [address]     Phone Number: [phone]    You have been referred to the Intensive Outpatient Program through [facility] [place]. They will be reaching out to you at [phone] within 3-5 business days in order to schedule your intake assessment.  IOP is an ideal level of mental health treatment for adults struggling with symptoms of mental illness that are interfering with their daily living, and are not manageable in weekly therapy or medication management.  Patients participate in group programming, while working on an individualized treatment plan.  Treatment is supported by a multi-disciplinary team including Psychiatrists, Therapists, and Social Workers.    A referral for mental health case management has been made for you in [facility]. They will reach out to you directly. To follow up or with questions, call [phone].  The [facility] Adult Mental Health Unit assists adults with mental illness in the following ways:    Provides information and referral, screening, case management, and other support services.    Connects individuals to resources in the community.    Contracts for Crisis Services and ACT (Assertive Community Treatment) Services.    A referral for [facility] has been made for you with [facility]. They will reach out to you  directly. To follow up or with questions, call [phone].  Adult Rehabilitative Mental Health Services, or ARMHS, is a program to help individuals, who have a mental illness, function independently in their homes or places of residence.  At its core, ARMHS is person-centered, skills-based and goal-oriented. This all means that ARMHS can help a client work towards and achieve their goals, while focusing on a mental health perspective.    Autism Resources:    AuS: Autism Society of [place]  The mission of the Autism Society of [place] (AuS) is to enhance the lives of individuals and families affected by Autism Spectrum Disorder. AuS serves [place]ans, throughout their lives, with a fundamental commitment to advocacy, education, support, collaboration, and community building.  [address]  [place] [address]  [phone]  Https://ausm.org/    [place] [facility]  [phone]    [place] [facility] Autism Spectrum Group Program  Intensive Outpatient Program for Autism  [facility] has expanded its services to those with Autism Spectrum Disorder (ASD) at our [place] site.  The Intensive Outpatient Program for ASD is a 6-week program that takes place for 3 hours a day (Monday - Thursday 12:00pm-3:00pm). It is designed to provide treatment for individuals diagnosed with Autism Spectrum Disorder between the ages of 18-25 who need more support than what weekly individual therapy or social skills can offer.  Call [phone] for more information    [facility] Independent Living Skills Program  Helping young adults ages 18+ on the Autism spectrum or with a related diagnosis gain skills for independent living and personal growth  On the journey to increased independence, many young people require additional guidance and support in order to be successful during this exciting, but challenging time. [facility] is dedicated to providing that support in a comfortable and effective, small  group setting.  Call [phone] or email [email or web address]    Family Therapy Resources:    [facility]  At [facility], we focus on bringing families back together through proven techniques designed to foster honest communication. By being honest with our loved ones, we ve found most families able to break down barriers and get through the healing process. Without honest communication, the healing process simply doesn t begin.   [address] [place] [address] [address]   [place] [address]  [phone]    [facility]  Family Therapy  The family environment plays a big role in one s life.  Helping the family discover new ways to relate and communicate often reduces conflict and creates a supportive environment.  [phone]    [facility] For Personal & Family Development      Attend all scheduled appointments with your outpatient providers. Call at least 24 hours in advance if you need to reschedule an appointment to ensure continued access to your outpatient providers.     Major Treatments, Procedures and Findings:  You were provided with: a psychiatric assessment, assessed for medical stability, medication evaluation and/or management, group therapy and milieu management    Symptoms to Report: feeling more aggressive, increased confusion, losing more sleep, mood getting worse or thoughts of suicide    Early warning signs can include: increased depression or anxiety sleep disturbances increased thoughts or behaviors of suicide or self-harm  increased unusual thinking, such as paranoia or hearing voices    Safety and Wellness:  Take all medicines as directed.  Make no changes unless your doctor suggests them.      Follow treatment recommendations.  Refrain from alcohol and non-prescribed drugs.  If there is a concern for safety, call 551.    Resources:   Crisis Intervention: 182.918.6734 or 210-112-4834 (TTY: 985.261.1289).  Call anytime for  "help.  National [place] on Mental Illness (www.[place].diaz.org): 464.908.8968 or 321-674-3403.  [place] Association for Children's Mental Health (www.mac.org): 301.768.1918.  Alcoholics Anonymous (www.alcoholics-anonymous.org): Check your phone book for your local chapter.  Suicide Awareness Voices of Education (SAVE) (www.save.org): 363-045-UZDW (2224)  National Suicide Prevention Line (www.mentalhealthmn.org): 672-535-WIKB (4551)  Mental Health Consumer/Survivor Network of [place] (www.mhcsn.net): 673.904.9539 or 857-192-4333  Mental Health Association of [place] (www.mentalhealth.org): 995.184.8768 or 880-935-4100  Self- Management and Recovery Training., SMART-- Toll free: 973.864.9446  www.Reasult.StartMe  [facility] Crisis Response 205 984-9943  [facility] Crisis Response [phone]  [facility] Crisis Response [phone]  [facility] Crisis (COPE) Response - Adult [phone]  [facility] Crisis Response - Adult [phone]  [facility] Rapid Response [phone]  Text 4 Life: txt \"LIFE\" to 00614 for immediate support and crisis intervention  Crisis text line: Text \"[place]\" to 427400. Free, confidential, 24/7.  Crisis Intervention: 599.353.1427 or 523-574-0541. Call anytime for help.   [facility] Mental Health Crisis Team - Child: [phone]  [facility]'s Mental Health Crisis Response Team - Child: [phone]  [facility] Mental Health Crisis: 1-324-677-7964   [facility] Mental Health Crisis Team:  [phone]  [place], and [facility] Mobile Crisis Response Team (CRT):  [phone] or [phone]     General Medication Instructions:   See your medication sheet(s) for instructions.   Take all medicines as directed.  Make no changes unless your doctor suggests them.   Go to all your doctor visits.  Be sure to have all your required lab tests. This way, your medicines can be refilled on time.  Do not use any " drugs not prescribed by your doctor.  Avoid alcohol.    Advance Directives:   Scanned document on file with [employer]? No scanned doc  Is document scanned? Pt states no documents  Honoring Choices Your Rights Handout: Informed and given  Was more information offered? Pt declined    The Treatment team has appreciated the opportunity to work with you. If you have any questions or concerns about your recent admission, you can contact the unit which can receive your call 24 hours a day, 7 days a week. They will be able to get in touch with a Provider if needed. The unit number is [phone].

## 2021-12-15 NOTE — PLAN OF CARE
Problem: Suicidal Behavior  Goal: Suicidal Behavior is Absent or Managed  Outcome: Improving     Problem: Suicide Risk  Goal: Absence of Self-Harm  Outcome: Improving.  Pt has been calm and polite all evening, visible and interacted with peers and staff, no behaviors noted or reported. Mom visited, excited about pending discharge.

## 2021-12-15 NOTE — PLAN OF CARE
Occupational Therapy Discharge Note    Patient Name:  Yoni rTan    D: Refer to daily doc flowsheets for details of progress toward goals.  A: Improvement seen in management of symptoms, increased socialization with peers and engagement in group programming.   P: Patient discharging to home with mother and IOP program/outpatient MH supports in place. Discontinue OT Care Plan goals and interventions.    Date: 12/15/2021  Time: 3:38 PM       Problem: Relapse Prevention  Goal: Engage in OT Group  Description: Pt will engage in at least 5 OT group activity to support recovery of symptoms during this reporting period.   Outcome: Completed     Problem: Coping with Symptoms  Goal: Identify Coping Skills  Description: Patient will identify 3 healthy coping skills to manage anxiety and reduce symptoms during this reporting.   Outcome: Completed

## 2021-12-15 NOTE — PROVIDER NOTIFICATION
12/11/21 1300   Engagement   Intervention Group   Topic Insight development/self-awareness   Topic Detail SW Process Group: Self-Awareness   Attendance Attended   Patient Response Demonstrated understanding of materials provided   Concentrated on Task duration of group   Cognition Goal-directed;Follows through with task   Mood/Affect Pleasant;Content   Social/Behavioral Engaged;Cooperative   Thought Content Insightful     GROUP LENGTH (MINS):   35 minutes   RELEVANT PRIMARY DIAGNOSIS: Patient Active Problem List   Diagnosis     Depression with suicidal ideation     Major depressive disorder, recurrent episode, moderate (H)     ADHD (attention deficit hyperactivity disorder)     Active autistic disorder        TREATMENT MODALITY:      []CBT       []DBT       []ACT       [x]Interpersonal psychotherapy                                 [x]Psychoeducational therapy       []Skill development       []Other:        ATTENDANCE:        [x]Stayed for entire group     []Arrived late    [] Left early       # OF PATIENTS IN GROUP: 7   BILLABLE:     [x]Yes            []No   Notes:          
   12/13/21 1200   Engagement   Intervention Group   Topic Insight development/self-awareness;Social/communication skills   Topic Detail SW Process: Boundaries   Attendance Attended   Patient Response Demonstrated understanding of materials provided   Concentrated on Task duration of group   Cognition Goal-directed;Takes initiative   Mood/Affect Content;Pleasant   Social/Behavioral Cooperative;Engaged   Thought Content Insightful       GROUP LENGTH (MINS):   30 minutes   RELEVANT PRIMARY DIAGNOSIS: Patient Active Problem List   Diagnosis     Depression with suicidal ideation     Major depressive disorder, recurrent episode, moderate (H)     ADHD (attention deficit hyperactivity disorder)     Active autistic disorder        TREATMENT MODALITY:      []CBT       []DBT       []ACT       []Interpersonal psychotherapy                                 []Psychoeducational therapy       [x]Skill development       []Other:        ATTENDANCE:        [x]Stayed for entire group     []Arrived late    [] Left early       # OF PATIENTS IN GROUP: 4   BILLABLE:     []Yes            [x]No   Notes:          
   12/15/21 1100   Engagement   Intervention Group   Topic Insight development/self-awareness   Topic Detail SW Process Group: Values   Attendance Attended   Patient Response Demonstrated understanding of materials provided;Was respectful   Concentrated on Task duration of group   Cognition Goal-directed;Takes initiative   Mood/Affect Content;Pleasant   Social/Behavioral Cooperative;Engaged   Thought Content Insightful     GROUP LENGTH (MINS):   30 minutes   RELEVANT PRIMARY DIAGNOSIS: Patient Active Problem List   Diagnosis     Depression with suicidal ideation     Major depressive disorder, recurrent episode, moderate (H)     ADHD (attention deficit hyperactivity disorder)     Active autistic disorder        TREATMENT MODALITY:      []CBT       []DBT       []ACT       []Interpersonal psychotherapy                                 [x]Psychoeducational therapy       []Skill development       []Other:        ATTENDANCE:        [x]Stayed for entire group     []Arrived late    [] Left early       # OF PATIENTS IN GROUP: 5   BILLABLE:     []Yes            [x]No   Notes:          
no